# Patient Record
Sex: FEMALE | Race: WHITE | NOT HISPANIC OR LATINO | Employment: OTHER | URBAN - METROPOLITAN AREA
[De-identification: names, ages, dates, MRNs, and addresses within clinical notes are randomized per-mention and may not be internally consistent; named-entity substitution may affect disease eponyms.]

---

## 2024-03-18 NOTE — PROGRESS NOTES
GYNECOLOGIC ONCOLOGY CONSULT    Patient Name: FREDERICK SPIVEY Patient : 1950  Patient MRN: [id number]  Referring Physician: David Quach MD  Primary GYNOncologist: Hiral Arroyo (Gynecological/Oncology)  Date of Service: 2024    Reason for Consult:  Pelvic mass  Postmenopausal bleeding   Morbid obesity, BMI 46.53    History of Present Illness (Gyn Oncology):  Frederick is a 74 yo  female with postmenopausal bleeding and complex, pelvic mass. She has had postmenopausal bleeding since  with cramping and intermittent, like she is resuming menses. She reports this coincided with her COVID-19 vaccine. Endometrial stripe is noted to be thickened at 9 mm. She also had multiple myomas visualized on pelvic ultrasound. The uterus measured 9.9 x 6.1 x 5.2 cm. The left ovary was not identified. The right adnexal complex measured 9.2 x 6.2 x 5.9 cm. She saw Dr. Quach for consultation on 2023 after noting postmenopausal bleeding during her routine Medicare wellness visit on 2023. Tumor markers were drawn on 2023 and noted normal Ca-125, normal CEA and normal Ca 19-9. She continues to endorse cramping pain. Her daughter is present at today's visit. Frederick walks with assistance of walker, has history of TIA's and has difficulty with movement of right wrist and hand secondary to history of a fracture. She also reports a remote coccyx fracture on the log chute ride at [facility] and reports lying flat is extremely painful. A previous MRI of her spine had to be done under anesthesia as could not tolerate that positioning. She also has had a new tremor with referral to neurology. She lives independently in [facility]. She is here today to discuss management options in regards to these abnormal symptoms and ultrasonographic findings. Of note, she reports she is DNR/DNI and that she would not be interested in future radiation therapy or chemotherapy. She wants focus to be on quality of life. Her  daughter is her medical POA, too, and confirms that she has talked to her mother in regards to these goals of care.    Genetic Testing  N/A    Review of Systems:  A complete 14-point review of systems is negative except as noted in the above history of present illness.    Past Medical History:  1. TIAs  2. Urinary incontinence, mixed stress and urge  3. BMI 46.53  4. Fibromyalgia    Surgical History:  1. Oral surgery -   2. Right wrist surgery -     OB/Gyn History:  .  x 1.  Menarche: age 8 yo  Reports hx of fibroids  Reports hx of heavy vaginal bleeding  Reports hx of pelvic prolapse and urinary problems    Allergies#  No known medication allergies    Medications:  Vitamin D3 (Cholecalciferol Oral)  Aspirin Oral 81 mg tablet,delayed release (DR/EC) 1 tablet,delayed release (DR/EC) orally daily  Zinc Oral  Vitamin C (Ascorbic Acid Oral)    Family History:  Lymphoma - father in   Lung Ca - brother in   Bone Ca - brother in   Mesothelioma - uncle in   Breast Ca - aunt in     Social History:  Smoking Status Smoking Tobacco : Never smoker; Smokeless Tobacco : Never used smokeless tobacco; Vaping : Never vaped  Minimal alcohol use.   Single.  Lives alone in senior living complex.   Relies on daughter & God for emotional support and help with decisions.    Health Maintenance:  Last colonoscopy - 2019  Last mammogram - unsure; cannot recall  Last pap smear - unsure; cannot recall    Vital Signs:  Blood pressure: 142/82, Pulse: 91, Temperature: 98.4 F, Respirations: 16, O2 sat: 97%, Pain Scale: 4, Height: 62 in, Weight: 254.4 lb, BSA: 2.12, BMI: 46.53 kg/m2    Physical Exam (Gyn Oncology):  General: alert, cooperative, conversational, no acute distress  HEENT: atraumatic, normocephalic, trachea midline, no obvious thyromegaly  Cardiac: well-perfused  Lungs: no labored breathing on room air  Abdomen: obese, soft, non-tender  Extremities: mild pitting edema, bilaterally, of lower  extremities. Difficulty with range of motion in right wrist and right hand.  Neurologic: no focal deficits  Psych: normal mood and affect  Pelvic: deferred to OR      Laboratory Data:  Tumor markers drawn on 12/5/2023  Ca 125 = 10.4 u/mL  CEA  = 4.8 ng/mL  Ca 19-9 = 10 IU/mL            Imaging:  Pelvic Ultrasound on 11/29/2023  Findings:  The uterus is present, anteverted, without deviation and measures 9.9 x 6.1 x 5.2 cm.  The myometrium is heterogeneous.  The endometrium thickness if 9 mm.  Multiple myomas are noted in the uterus; the three largest as as follows:  Myoma #1 is submucosal, is located in the mid segment of the uterus, and measures 1.7 x 1.5 x 1.6 cm. It does appear to distort the endometrial lining.  Myoma #2 is subserosal, is located in the right and fundal segment of the uterus, and measures 2.8 x 2.8 x 1.6 cm. It does not appear to distort the endometrial lining.  Myoma #3 is intramural, is located in the mid segment of the uterus, and measures 3.2 x 2.3 x 2.1 cm. It does not appear to distort the endometrial lining.   Impression:  1. The uterus has myomas as described above.  2. Complex, somewhat ill-defined mass with mixed echogenicities is noted in the adnexal region measuring 9.2 x 6.2 x 5.9 cm. Similar in appearance but larger in size compared to pelvic ultrasound in 2013. Differential diagnosis includes uterine fibroid, dermoid cyst vs malignancy. Recommend follow up with pelvic MRI for further evaluation.  3. The left ovary was not identified in today's study.    Problems:  Pelvic mass  Postmenopausal bleeding    Assessment & Plan (Gyn Oncology):      1. Postmenopausal bleeding (since 2021), thickened endometrial stripe of 9 mm, complex right adnexal mass  Discussed concern for malignant process of either uterus, ovary and/or both sites  Discussed definitive diagnosis would only be determined via surgery  Discussed that surgery would also likely offer symptom alleviation (i.e.  "postmenopausal bleeding, cramping pain)  Reviewed risks, benefits and alternatives to surgery  Reviewed attempt at robotic approach for decreased blood loss and reduced postoperative pain & likely a little better recovery  Discussed alternatives would be to avoid surgery. However, the size of the mass is NOT at a size that I would recommend watchful waiting. She is also borderline for minimally invasive opportunity, so, if mass were to grow much larger, than, we might lose the ability to start out with MIS technique.  We reviewed benefits include: definitive diagnosis, symptom alleviation, possible therapeutic removal of cancer, ability to stage, ability to inform prognosis, ability to inform adjuvant therapies.  We reviewed risks include, but are not limited to: bleeding (amenable to blood transfusion, if needed), postoperative pain/discomfort, future hernia formation, infection, increased risks of blood clots, cardiac events, strokes, risks with general anesthesia, injury to surrounding structures and failure to cure.  She and daughter had a good understanding after today's detailed discussion. Thao does value quality of life and would likely not be interested in aggressive anti-cancer therapies postoperatively. However, she would like symptom alleviation and definitive diagnosis. She desires to proceed with surgery.  Preoperative surgical education was completed today with RN team.  Patient also had opportunity to meet with surgical schedulers & look at future dates.  All questions answered to Thao & her daughter's satisfaction today.    2. Preoperative considerations  \"Robotic-assisted total laparoscopic hysterectomy, bilateral salpingo-oophorectomy, pelvic washings, possible cancer staging\"  General anesthesia + TAP block  Will schedule at [facility]  Aware would need to convert to laparotomy if unable to tolerate steep Trendelenburg positioning, emergent bleeding or for cancer indications  Labs on day of surgery: " T&S, Hgb  Surgical antibiotic prophylaxis: IV Cefazolin 3 grams + IV Metronidazole 500 milligrams  VTE prophylaxis: bilateral SCDs, SQ Heparin 5,000 units  OR positioning: Will require additional padding, particularly over coccyx given history of fracture and pain with lying flat. Patient instructed to remind all OR team members on day of surgery.  Patient will be admitted for overnight observation given age and the fact that she lives alone with limited mobility. Will request PT/OT evaluation while admitted. Aware that may or may NOT qualify for further therapies and/or TCU. Aware would go home POD#1 with observation status unless medical justification and/or needs TCU placement; would then be changed to inpatient status, at that time.  Code status: DNR/DNI. Aware would have to lift DNI for intubation required with general anesthesia. She and her daughter will discuss more if she would want DNR lifted in OR or leave in place. Will let us know on day of surgery.    3. Multiple medical comorbidities  New tremor & balance issues; patient has been referred to neurology  Requires assistance of walker  BMI 46.53  Hx of TIA's        Treatment Plan and Consent    Current treatment plan of surgery for definitive diagnosis of pelvic mass and postmenopausal bleeding (as well as symptom alleviation) was reviewed in detail with the patient. See counseling above. Specifically, the counseling included: goals of the treatment, prognosis, frequency, intended benefits, associated risks/harms and side effects and medically reasonable alternatives.    I spent a total of 55 minutes of cumulative time in care of this patient, which included >50% of time spent in direct patient care which included patient interview, examination, and treatment counseling. The remainder of the time was spent in medical documentation, review of records and care coordination.    I provided the patient an opportunity to ask questions and I answered all of  their treatment related questions to the best of my ability. The patient expressed understanding and consent to this plan of care.      Pain Care Management:  Pain Scale: 4      Hiral Arroyo MD  Phone: [phone]  Fax: [phone]

## 2024-04-07 NOTE — OP NOTE
Gynecologic Oncology Operative Report    2024  Thao Majano  [id number]    PREOPERATIVE DIAGNOSIS: Pelvic mass, postmenopausal bleeding, thickened endometrial stripe of 9 mm, BMI 46.53    POSTOPERATIVE DIAGNOSIS: Same    PROCEDURES:   Procedure(s):  ROBOTIC-ASSISTED TOTAL LAPAROSCOPIC HYSTERECTOMY, BILATERAL SALPINGO-OOPHORECTOMY, REMOVAL OF PELVIC MASS, PELVIC WASHINGS    SURGEON: Hiral Arroyo MD    FIRST ASSISTANT: Greta Caldwell PA-C    ANESTHESIA: General endotracheal.     ESTIMATED BLOOD LOSS: 300 cc     IV FLUIDS: 1400 ml crystalloid    URINE OUTPUT: 500 cc clear urine     INDICATIONS: Thao Majano is a 72 yo  female with postmenopausal bleeding and complex, pelvic mass. She has had postmenopausal bleeding since  with cramping and intermittent, like she is resumed menses. Endometrial stripe was noted to be thickened at 9 mm. She also had multiple myomas visualized on pelvic ultrasound. The uterus measured 9.9 x 6.1 x 5.2 cm. The left ovary was not identified. The right adnexal complex measured 9.2 x 6.2 x 5.9 cm. She saw Dr. Quach for consultation on 2023 after noting postmenopausal bleeding during her routine Medicare wellness visit on 2023. Tumor markers were drawn on 2023 and noted normal Ca-125, normal CEA and normal Ca 19-9. She desired to proceed with definitive surgical management.    FINDINGS: The uterus was moderately enlarged with grossly normal bilateral adnexa. There was a large, right paracervical mass arising from the uterus that was retroperitoneal in nature and bilobed. There were attachments to the bladder, right round ligament. right pelvic sidewall, posterior uterus and rectovaginal peritoneal reflection. The mass was well-circumscribed, ~15 x 12 cm, and removed intact without intraoperative spillage or rupture. The mass was  from the right lower uterine segment / cervix attachment. Bilateral ureters were without hydronephrosis and were  noted to be vermiculating. There was no bulky retroperitoneal adenopathy. The peritoneal surfaces were smooth, including bilateral hemidiaphragms. The liver, stomach and omentum were grossly normal. There was an omental adhesion to the anterior abdominal wall in midline near the camera port, that was not taken down as did not impact visualization. There was diverticular disease noted along the colon, but, otherwise small bowel and colon were grossly normal.     Intraoperative frozen section analysis noted benign findings within the endometrium and benign findings for the uterine mass, consistent with degenerating leiomyoma. No cancer staging procedures were indicated based on this current information.    Bilateral sulcal vaginal lacerations were repaired at the conclusion of the case. The vaginal cuff was intact. The vaginal introitus was narrowed in proximal portion with the laceration repair, but, cuff still palpable and vagina still patent.     SPECIMENS:   ID Type Source Tests Collected by Time Destination   1 : PELVIC WASHINGS Washings Pelvis NON-GYNECOLOGIC CYTOLOGY Hiral Arroyo MD 4/11/2024 11:37 AM    2 : UTERUS, CERVIX, BILATERAL FALLOPIAN TUBE AND OVARIES -- FROZEN ON ENDOMETRIUM Tissue Uterus, Cervix, Bilateral Fallopian Tubes & Ovaries SURGICAL PATHOLOGY EXAM Hiral Arroyo MD 4/11/2024 12:37 PM    3 : UTERINE MASS FOR FROZEN EXAM Tissue Other SURGICAL PATHOLOGY EXAM Hiral Arroyo MD 4/11/2024 12:53 PM        COMPLICATIONS: None.    CONDITION: Stable to PACU.    OPERATIVE PROCEDURE IN DETAIL: Consent was reviewed with the patient in the preoperative setting and confirmed. She received prophylactic antibiotics with IV Cefazolin 2 grams and IV Metronidazole 500 milligrams. In addition, she received  prophylactic SQ Heparin 5,000 units for venous thromboembolism prevention. A surgical debriefing was performed with the operating room team prior to patient entry into the operating room. The  patient was transferred to the operating room and placed in dorsal supine position. General anesthetic was obtained in the usual manner without noted difficulties. The patient was then positioned onto Naveen stirrups. The patient was prepped and draped for the above-mentioned procedure.    Timeout was called at which point the patient's name, procedure and operative site was confirmed by the operative team. The umbilicus was elevated and the Veress needle introduced through the base of the umbilicus. The abdomen was insufflated with an opening pressure of 3 mmHg. The Veress needle was removed. Sites for the da Robinson XI laparoscopic ports were demarcated, total of 5, initiating' midline at the level of the umbilicus and positioned in a straight line around the upper abdomen. The initial midline 8 mm port was inserted without difficulty. The remaining 4 ports were all placed under direct visualization, without any difficulties. The lateral most right port was a 8 mm AirSeal port. CO2 insufflation was switched over to AirSeal mode. The pelvis was inspected as well as the upper abdomen as noted above. Next, a Barr catheter was placed under sterile technique. A speculum was placed into the vagina. Instruments for the uterine manipulator were positioned. The anterior lip of the cervix was grasped. Next, the uterus sounded to 12 cm, and cervix was serially dilated. The medium VCare vaginal manipulator was then inserted and the cervical cup affixed without any difficulty. Gloves were changed and attention was turned back towards the abdomen. Bowels were packed up into the upper abdomen with gentle traction. At this point, da Robinson XI was docked onto the ports, all appropriate instruments were inserted.     The left round ligament was grasped, elevated, sealed and transected using synchroseal device. The posterior leaf of the left broad ligament was dissected. The left ureter was identified. A peritoneal window was made just  below the left IP ligament and well above the left ureter. The left IP ligament was multiply sealed and transected using the synchroseal device. The peritoneum underneath the left adnexa was dissected to the level of the uterus. Attention was turned to the right side. The same process was repeated on the right side. However, there was large mass on the right side that made this side technically more challenging. The mass had filmy peritoneal adhesions circumferentially. These were dissected using monopolar scissors along with traction and counter traction. The mass had a broad connection to the right side of the cervix, grossly consistent with paracervical fibroid. This connection was disrupted using synchroseal device. This was necessary to be able to visualize the pedicles for hysterectomy as well as for specimen removal. This added more blood loss than typical robotic hysterectomy case. The mass was carefully unroofed from the surrounding peritoneum with care to avoid injury to the right ureter and bladder.     At this point, we initiated the hysterectomy by incising the vesicouterine peritoneum. The bladder flap was developed well at the upper vagina. The uterus had adequate mobility, bilateral uterine arteries could be isolated and these were cauterized with the bipolar cautery and transected. Initially on the right, we extended along the cardinal and uterosacral ligaments, staying close to the cervix to the level of the upper vagina. This was cauterized and transected using the bipolar cautery. Similarly, we isolated out the left cardinal ligament and uterosacral ligament which were cauterized and transected. The line of the medium VCare cup was palpated at the level of the upper vagina in a circumferential manner. We incised along the upper vagina to resect the cervix and uterus. A 15 mm Endocatch bag was inserted and specimen was placed inside the bag. The specimen was brought out through the vaginal opening  "and labeled \"uterus, cervix, bilateral fallopian tubes and ovaries\" and sent to surgical pathology for intraoperative frozen section analysis of endometrium.     A new 15 mm Endocatch bag was inserted transvaginally and \"uterine mass\" was placed within the bag and removed through the vaginal opening. \"Uterine mass\" was sent to surgical pathology for intraoperative frozen section analysis.    A wet laparotomy sponge was inserted to obtain adequate insufflation. The vaginal cuff had been well-developed and clearly the bladder was out of the way. The vaginal cuff was closed with running V-lock suture with excellent reapproximation.     Intraoperative frozen section analysis noted benign findings within the endometrium and benign findings for the uterine mass, consistent with degenerating leiomyoma. No cancer staging procedures were indicated based on this current information.    The pelvis was re-inspected and copiously irrigated. A total of 20 cc of Surgiflo was placed along the pelvic dissection beds.    All laparoscopic instruments and ports were now removed and CO2 allowed to escape from the abdomen. The Gymbox XI robot was undocked without incident. Skin was reapproximated with 4-0 Monocryl sutures and skin glue applied.    The laparotomy sponge was removed from the vagina. The Barr catheter was left in situ. Bilateral sulcal lacerations were repaired in the usual fashion using running sutures of 2-0 Vicryl. The vaginal cuff was intact. The vaginal introitus was narrowed in proximal portion with the laceration repair, but, cuff still palpable and vagina still patent.     Greta Caldwell PA-C, was present and assisted the entire procedure. She assisted with placement of uterine manipulator, abdominal entry, port placement, docking of robotic arms, adequate visualization, retraction, uterine manipulation, bedside assisting via assistant port, surgical specimen handling/retrieval, undocking of robotic arms, and skin " closure.    All sponge, lap, needle and instrument counts were correct x 2. The patient tolerated the procedure well and there were no complications. She was returned to the supine position and general anesthesia was reversed without difficulty. She was taken to the recovery room in stable condition. I was present and scrubbed the entire procedure.    Hiral Arroyo MD  Gynecologic Oncology  [facility]  04/11/2024

## 2024-04-09 RX ORDER — ASPIRIN 81 MG/1
81 TABLET ORAL DAILY PRN
COMMUNITY

## 2024-04-09 RX ORDER — MULTIVIT-MIN/IRON/FOLIC ACID/K 18-600-40
1000 CAPSULE ORAL EVERY MORNING
Status: ON HOLD | COMMUNITY
End: 2024-04-15

## 2024-04-09 RX ORDER — MULTIVITAMIN
1 TABLET ORAL EVERY MORNING
Status: ON HOLD | COMMUNITY
End: 2024-04-15

## 2024-04-09 RX ORDER — TURMERIC ROOT EXTRACT 500 MG
2 TABLET ORAL EVERY MORNING
COMMUNITY

## 2024-04-09 RX ORDER — ZINC SULFATE 50(220)MG
220 CAPSULE ORAL EVERY MORNING
Status: ON HOLD | COMMUNITY
End: 2024-04-15

## 2024-04-09 RX ORDER — VITAMIN E (DL,TOCOPHERYL ACET) 90 MG
200 CAPSULE ORAL DAILY
Status: ON HOLD | COMMUNITY
End: 2024-04-15

## 2024-04-11 ENCOUNTER — HOSPITAL ENCOUNTER (OUTPATIENT)
Facility: CLINIC | Age: 74
Discharge: NURSING, REHAB OR LONG-TERM CARE | End: 2024-04-15
Attending: OBSTETRICS & GYNECOLOGY | Admitting: OBSTETRICS & GYNECOLOGY
Payer: COMMERCIAL

## 2024-04-11 ENCOUNTER — ANESTHESIA EVENT (OUTPATIENT)
Dept: SURGERY | Facility: CLINIC | Age: 74
End: 2024-04-11
Payer: COMMERCIAL

## 2024-04-11 ENCOUNTER — ANESTHESIA (OUTPATIENT)
Dept: SURGERY | Facility: CLINIC | Age: 74
End: 2024-04-11
Payer: COMMERCIAL

## 2024-04-11 DIAGNOSIS — D25.0 INTRAMURAL AND SUBMUCOUS LEIOMYOMA OF UTERUS: Primary | ICD-10-CM

## 2024-04-11 DIAGNOSIS — B37.9 CANDIDA INFECTION: ICD-10-CM

## 2024-04-11 DIAGNOSIS — D25.1 INTRAMURAL AND SUBMUCOUS LEIOMYOMA OF UTERUS: Primary | ICD-10-CM

## 2024-04-11 PROBLEM — D25.9 UTERINE FIBROID: Status: ACTIVE | Noted: 2024-04-11

## 2024-04-11 LAB
ABO/RH(D): NORMAL
ANTIBODY SCREEN: NEGATIVE
HCG INTACT+B SERPL-ACNC: <1 MIU/ML
HGB BLD-MCNC: 13.5 G/DL (ref 11.7–15.7)
PLATELET # BLD AUTO: 211 10E3/UL (ref 150–450)
SPECIMEN EXPIRATION DATE: NORMAL

## 2024-04-11 PROCEDURE — 999N000141 HC STATISTIC PRE-PROCEDURE NURSING ASSESSMENT: Performed by: OBSTETRICS & GYNECOLOGY

## 2024-04-11 PROCEDURE — 88342 IMHCHEM/IMCYTCHM 1ST ANTB: CPT | Mod: 26 | Performed by: STUDENT IN AN ORGANIZED HEALTH CARE EDUCATION/TRAINING PROGRAM

## 2024-04-11 PROCEDURE — 258N000003 HC RX IP 258 OP 636: Performed by: PHYSICIAN ASSISTANT

## 2024-04-11 PROCEDURE — 250N000009 HC RX 250: Performed by: REGISTERED NURSE

## 2024-04-11 PROCEDURE — 86336 INHIBIN A: CPT | Performed by: OBSTETRICS & GYNECOLOGY

## 2024-04-11 PROCEDURE — 36415 COLL VENOUS BLD VENIPUNCTURE: CPT | Performed by: PHYSICIAN ASSISTANT

## 2024-04-11 PROCEDURE — 258N000001 HC RX 258: Performed by: OBSTETRICS & GYNECOLOGY

## 2024-04-11 PROCEDURE — 88341 IMHCHEM/IMCYTCHM EA ADD ANTB: CPT | Mod: 26 | Performed by: STUDENT IN AN ORGANIZED HEALTH CARE EDUCATION/TRAINING PROGRAM

## 2024-04-11 PROCEDURE — 85049 AUTOMATED PLATELET COUNT: CPT | Performed by: PHYSICIAN ASSISTANT

## 2024-04-11 PROCEDURE — 710N000009 HC RECOVERY PHASE 1, LEVEL 1, PER MIN: Performed by: OBSTETRICS & GYNECOLOGY

## 2024-04-11 PROCEDURE — 88112 CYTOPATH CELL ENHANCE TECH: CPT | Mod: TC | Performed by: OBSTETRICS & GYNECOLOGY

## 2024-04-11 PROCEDURE — 88331 PATH CONSLTJ SURG 1 BLK 1SPC: CPT | Mod: 26 | Performed by: PATHOLOGY

## 2024-04-11 PROCEDURE — 84702 CHORIONIC GONADOTROPIN TEST: CPT | Performed by: PHYSICIAN ASSISTANT

## 2024-04-11 PROCEDURE — 250N000011 HC RX IP 250 OP 636: Performed by: REGISTERED NURSE

## 2024-04-11 PROCEDURE — 58542 LSH W/T/O UT 250 G OR LESS: CPT | Performed by: REGISTERED NURSE

## 2024-04-11 PROCEDURE — 250N000009 HC RX 250: Performed by: OBSTETRICS & GYNECOLOGY

## 2024-04-11 PROCEDURE — 88331 PATH CONSLTJ SURG 1 BLK 1SPC: CPT | Mod: TC,59 | Performed by: OBSTETRICS & GYNECOLOGY

## 2024-04-11 PROCEDURE — 250N000025 HC SEVOFLURANE, PER MIN: Performed by: OBSTETRICS & GYNECOLOGY

## 2024-04-11 PROCEDURE — 258N000003 HC RX IP 258 OP 636: Performed by: REGISTERED NURSE

## 2024-04-11 PROCEDURE — 250N000011 HC RX IP 250 OP 636: Performed by: PHYSICIAN ASSISTANT

## 2024-04-11 PROCEDURE — 99100 ANES PT EXTEME AGE<1 YR&>70: CPT | Performed by: REGISTERED NURSE

## 2024-04-11 PROCEDURE — 86900 BLOOD TYPING SEROLOGIC ABO: CPT | Performed by: PHYSICIAN ASSISTANT

## 2024-04-11 PROCEDURE — 250N000013 HC RX MED GY IP 250 OP 250 PS 637: Performed by: PHYSICIAN ASSISTANT

## 2024-04-11 PROCEDURE — 250N000011 HC RX IP 250 OP 636: Mod: JZ | Performed by: PHYSICIAN ASSISTANT

## 2024-04-11 PROCEDURE — 58542 LSH W/T/O UT 250 G OR LESS: CPT | Performed by: ANESTHESIOLOGY

## 2024-04-11 PROCEDURE — 272N000001 HC OR GENERAL SUPPLY STERILE: Performed by: OBSTETRICS & GYNECOLOGY

## 2024-04-11 PROCEDURE — 85018 HEMOGLOBIN: CPT | Performed by: OBSTETRICS & GYNECOLOGY

## 2024-04-11 PROCEDURE — 88309 TISSUE EXAM BY PATHOLOGIST: CPT | Mod: 26 | Performed by: STUDENT IN AN ORGANIZED HEALTH CARE EDUCATION/TRAINING PROGRAM

## 2024-04-11 PROCEDURE — 360N000080 HC SURGERY LEVEL 7, PER MIN: Performed by: OBSTETRICS & GYNECOLOGY

## 2024-04-11 PROCEDURE — 250N000011 HC RX IP 250 OP 636: Performed by: OBSTETRICS & GYNECOLOGY

## 2024-04-11 PROCEDURE — 82565 ASSAY OF CREATININE: CPT | Performed by: OBSTETRICS & GYNECOLOGY

## 2024-04-11 PROCEDURE — 370N000017 HC ANESTHESIA TECHNICAL FEE, PER MIN: Performed by: OBSTETRICS & GYNECOLOGY

## 2024-04-11 PROCEDURE — 88307 TISSUE EXAM BY PATHOLOGIST: CPT | Mod: 26 | Performed by: STUDENT IN AN ORGANIZED HEALTH CARE EDUCATION/TRAINING PROGRAM

## 2024-04-11 RX ORDER — NALOXONE HYDROCHLORIDE 0.4 MG/ML
0.2 INJECTION, SOLUTION INTRAMUSCULAR; INTRAVENOUS; SUBCUTANEOUS
Status: DISCONTINUED | OUTPATIENT
Start: 2024-04-11 | End: 2024-04-15 | Stop reason: HOSPADM

## 2024-04-11 RX ORDER — ONDANSETRON 4 MG/1
4 TABLET, ORALLY DISINTEGRATING ORAL EVERY 30 MIN PRN
Status: DISCONTINUED | OUTPATIENT
Start: 2024-04-11 | End: 2024-04-11 | Stop reason: HOSPADM

## 2024-04-11 RX ORDER — SODIUM CHLORIDE, SODIUM LACTATE, POTASSIUM CHLORIDE, CALCIUM CHLORIDE 600; 310; 30; 20 MG/100ML; MG/100ML; MG/100ML; MG/100ML
INJECTION, SOLUTION INTRAVENOUS CONTINUOUS PRN
Status: DISCONTINUED | OUTPATIENT
Start: 2024-04-11 | End: 2024-04-11

## 2024-04-11 RX ORDER — ONDANSETRON 2 MG/ML
4 INJECTION INTRAMUSCULAR; INTRAVENOUS EVERY 6 HOURS PRN
Status: DISCONTINUED | OUTPATIENT
Start: 2024-04-11 | End: 2024-04-15 | Stop reason: HOSPADM

## 2024-04-11 RX ORDER — CALCIUM CARBONATE 500 MG/1
500 TABLET, CHEWABLE ORAL 4 TIMES DAILY PRN
Status: DISCONTINUED | OUTPATIENT
Start: 2024-04-11 | End: 2024-04-15 | Stop reason: HOSPADM

## 2024-04-11 RX ORDER — KETOROLAC TROMETHAMINE 15 MG/ML
15 INJECTION, SOLUTION INTRAMUSCULAR; INTRAVENOUS EVERY 6 HOURS
Qty: 20 ML | Refills: 0 | Status: DISCONTINUED | OUTPATIENT
Start: 2024-04-11 | End: 2024-04-15 | Stop reason: HOSPADM

## 2024-04-11 RX ORDER — IBUPROFEN 600 MG/1
600 TABLET, FILM COATED ORAL EVERY 6 HOURS
Qty: 20 TABLET | Refills: 0 | Status: DISCONTINUED | OUTPATIENT
Start: 2024-04-11 | End: 2024-04-15 | Stop reason: HOSPADM

## 2024-04-11 RX ORDER — PROPOFOL 10 MG/ML
INJECTION, EMULSION INTRAVENOUS PRN
Status: DISCONTINUED | OUTPATIENT
Start: 2024-04-11 | End: 2024-04-11

## 2024-04-11 RX ORDER — HEPARIN SODIUM 5000 [USP'U]/.5ML
5000 INJECTION, SOLUTION INTRAVENOUS; SUBCUTANEOUS
Status: COMPLETED | OUTPATIENT
Start: 2024-04-11 | End: 2024-04-11

## 2024-04-11 RX ORDER — OXYCODONE HYDROCHLORIDE 5 MG/1
10 TABLET ORAL EVERY 4 HOURS PRN
Status: DISCONTINUED | OUTPATIENT
Start: 2024-04-11 | End: 2024-04-15 | Stop reason: HOSPADM

## 2024-04-11 RX ORDER — AMOXICILLIN 250 MG
1 CAPSULE ORAL 2 TIMES DAILY
Status: DISCONTINUED | OUTPATIENT
Start: 2024-04-11 | End: 2024-04-15 | Stop reason: HOSPADM

## 2024-04-11 RX ORDER — MEPERIDINE HYDROCHLORIDE 25 MG/ML
12.5 INJECTION INTRAMUSCULAR; INTRAVENOUS; SUBCUTANEOUS EVERY 5 MIN PRN
Status: DISCONTINUED | OUTPATIENT
Start: 2024-04-11 | End: 2024-04-11 | Stop reason: HOSPADM

## 2024-04-11 RX ORDER — SODIUM CHLORIDE 9 MG/ML
INJECTION, SOLUTION INTRAVENOUS CONTINUOUS
Status: DISCONTINUED | OUTPATIENT
Start: 2024-04-11 | End: 2024-04-12

## 2024-04-11 RX ORDER — HYDROMORPHONE HCL IN WATER/PF 6 MG/30 ML
0.2 PATIENT CONTROLLED ANALGESIA SYRINGE INTRAVENOUS EVERY 5 MIN PRN
Status: DISCONTINUED | OUTPATIENT
Start: 2024-04-11 | End: 2024-04-11 | Stop reason: HOSPADM

## 2024-04-11 RX ORDER — HYDROMORPHONE HYDROCHLORIDE 1 MG/ML
INJECTION, SOLUTION INTRAMUSCULAR; INTRAVENOUS; SUBCUTANEOUS PRN
Status: DISCONTINUED | OUTPATIENT
Start: 2024-04-11 | End: 2024-04-11

## 2024-04-11 RX ORDER — CEFAZOLIN SODIUM/WATER 2 G/20 ML
2 SYRINGE (ML) INTRAVENOUS SEE ADMIN INSTRUCTIONS
Status: DISCONTINUED | OUTPATIENT
Start: 2024-04-11 | End: 2024-04-11 | Stop reason: HOSPADM

## 2024-04-11 RX ORDER — SODIUM CHLORIDE, SODIUM LACTATE, POTASSIUM CHLORIDE, CALCIUM CHLORIDE 600; 310; 30; 20 MG/100ML; MG/100ML; MG/100ML; MG/100ML
INJECTION, SOLUTION INTRAVENOUS CONTINUOUS
Status: DISCONTINUED | OUTPATIENT
Start: 2024-04-11 | End: 2024-04-11 | Stop reason: HOSPADM

## 2024-04-11 RX ORDER — CARBOXYMETHYLCELLULOSE SODIUM 5 MG/ML
1 SOLUTION/ DROPS OPHTHALMIC 3 TIMES DAILY PRN
COMMUNITY

## 2024-04-11 RX ORDER — OXYCODONE HYDROCHLORIDE 5 MG/1
5 TABLET ORAL EVERY 4 HOURS PRN
Status: DISCONTINUED | OUTPATIENT
Start: 2024-04-11 | End: 2024-04-15 | Stop reason: HOSPADM

## 2024-04-11 RX ORDER — DEXAMETHASONE SODIUM PHOSPHATE 4 MG/ML
INJECTION, SOLUTION INTRA-ARTICULAR; INTRALESIONAL; INTRAMUSCULAR; INTRAVENOUS; SOFT TISSUE PRN
Status: DISCONTINUED | OUTPATIENT
Start: 2024-04-11 | End: 2024-04-11

## 2024-04-11 RX ORDER — CEFAZOLIN SODIUM/WATER 2 G/20 ML
2 SYRINGE (ML) INTRAVENOUS
Status: COMPLETED | OUTPATIENT
Start: 2024-04-11 | End: 2024-04-11

## 2024-04-11 RX ORDER — ACETAMINOPHEN 325 MG/1
975 TABLET ORAL ONCE
Status: COMPLETED | OUTPATIENT
Start: 2024-04-11 | End: 2024-04-11

## 2024-04-11 RX ORDER — ACETAMINOPHEN 500 MG
1000 TABLET ORAL EVERY 6 HOURS
Qty: 20 TABLET | Refills: 0 | Status: COMPLETED | OUTPATIENT
Start: 2024-04-11 | End: 2024-04-14

## 2024-04-11 RX ORDER — NALOXONE HYDROCHLORIDE 0.4 MG/ML
0.4 INJECTION, SOLUTION INTRAMUSCULAR; INTRAVENOUS; SUBCUTANEOUS
Status: DISCONTINUED | OUTPATIENT
Start: 2024-04-11 | End: 2024-04-15 | Stop reason: HOSPADM

## 2024-04-11 RX ORDER — NALOXONE HYDROCHLORIDE 0.4 MG/ML
0.1 INJECTION, SOLUTION INTRAMUSCULAR; INTRAVENOUS; SUBCUTANEOUS
Status: DISCONTINUED | OUTPATIENT
Start: 2024-04-11 | End: 2024-04-11 | Stop reason: HOSPADM

## 2024-04-11 RX ORDER — LIDOCAINE HYDROCHLORIDE 20 MG/ML
INJECTION, SOLUTION INFILTRATION; PERINEURAL PRN
Status: DISCONTINUED | OUTPATIENT
Start: 2024-04-11 | End: 2024-04-11

## 2024-04-11 RX ORDER — HYDROMORPHONE HCL IN WATER/PF 6 MG/30 ML
0.2 PATIENT CONTROLLED ANALGESIA SYRINGE INTRAVENOUS
Status: DISCONTINUED | OUTPATIENT
Start: 2024-04-11 | End: 2024-04-15 | Stop reason: HOSPADM

## 2024-04-11 RX ORDER — PROCHLORPERAZINE MALEATE 5 MG
5 TABLET ORAL EVERY 6 HOURS PRN
Status: DISCONTINUED | OUTPATIENT
Start: 2024-04-11 | End: 2024-04-15 | Stop reason: HOSPADM

## 2024-04-11 RX ORDER — FENTANYL CITRATE 0.05 MG/ML
50 INJECTION, SOLUTION INTRAMUSCULAR; INTRAVENOUS EVERY 5 MIN PRN
Status: DISCONTINUED | OUTPATIENT
Start: 2024-04-11 | End: 2024-04-11 | Stop reason: HOSPADM

## 2024-04-11 RX ORDER — FENTANYL CITRATE 0.05 MG/ML
25 INJECTION, SOLUTION INTRAMUSCULAR; INTRAVENOUS EVERY 5 MIN PRN
Status: DISCONTINUED | OUTPATIENT
Start: 2024-04-11 | End: 2024-04-11 | Stop reason: HOSPADM

## 2024-04-11 RX ORDER — HYDROMORPHONE HCL IN WATER/PF 6 MG/30 ML
0.4 PATIENT CONTROLLED ANALGESIA SYRINGE INTRAVENOUS EVERY 5 MIN PRN
Status: DISCONTINUED | OUTPATIENT
Start: 2024-04-11 | End: 2024-04-11 | Stop reason: HOSPADM

## 2024-04-11 RX ORDER — ONDANSETRON 2 MG/ML
INJECTION INTRAMUSCULAR; INTRAVENOUS PRN
Status: DISCONTINUED | OUTPATIENT
Start: 2024-04-11 | End: 2024-04-11

## 2024-04-11 RX ORDER — METRONIDAZOLE 500 MG/100ML
500 INJECTION, SOLUTION INTRAVENOUS
Status: COMPLETED | OUTPATIENT
Start: 2024-04-11 | End: 2024-04-11

## 2024-04-11 RX ORDER — MAGNESIUM HYDROXIDE 1200 MG/15ML
LIQUID ORAL PRN
Status: DISCONTINUED | OUTPATIENT
Start: 2024-04-11 | End: 2024-04-11 | Stop reason: HOSPADM

## 2024-04-11 RX ORDER — HYDROMORPHONE HCL IN WATER/PF 6 MG/30 ML
0.4 PATIENT CONTROLLED ANALGESIA SYRINGE INTRAVENOUS
Status: DISCONTINUED | OUTPATIENT
Start: 2024-04-11 | End: 2024-04-15 | Stop reason: HOSPADM

## 2024-04-11 RX ORDER — ONDANSETRON 4 MG/1
4 TABLET, ORALLY DISINTEGRATING ORAL EVERY 6 HOURS PRN
Status: DISCONTINUED | OUTPATIENT
Start: 2024-04-11 | End: 2024-04-15 | Stop reason: HOSPADM

## 2024-04-11 RX ORDER — HEPARIN SODIUM 5000 [USP'U]/.5ML
5000 INJECTION, SOLUTION INTRAVENOUS; SUBCUTANEOUS EVERY 8 HOURS
Status: DISCONTINUED | OUTPATIENT
Start: 2024-04-12 | End: 2024-04-13

## 2024-04-11 RX ORDER — LIDOCAINE 40 MG/G
CREAM TOPICAL
Status: DISCONTINUED | OUTPATIENT
Start: 2024-04-11 | End: 2024-04-15 | Stop reason: HOSPADM

## 2024-04-11 RX ORDER — IBUPROFEN 200 MG
200 TABLET ORAL EVERY 4 HOURS PRN
Status: ON HOLD | COMMUNITY
End: 2024-04-15

## 2024-04-11 RX ORDER — ONDANSETRON 2 MG/ML
4 INJECTION INTRAMUSCULAR; INTRAVENOUS EVERY 30 MIN PRN
Status: DISCONTINUED | OUTPATIENT
Start: 2024-04-11 | End: 2024-04-11 | Stop reason: HOSPADM

## 2024-04-11 RX ORDER — BUPIVACAINE HYDROCHLORIDE 5 MG/ML
INJECTION, SOLUTION EPIDURAL; INTRACAUDAL PRN
Status: DISCONTINUED | OUTPATIENT
Start: 2024-04-11 | End: 2024-04-11 | Stop reason: HOSPADM

## 2024-04-11 RX ORDER — FENTANYL CITRATE 50 UG/ML
INJECTION, SOLUTION INTRAMUSCULAR; INTRAVENOUS PRN
Status: DISCONTINUED | OUTPATIENT
Start: 2024-04-11 | End: 2024-04-11

## 2024-04-11 RX ADMIN — SODIUM CHLORIDE, POTASSIUM CHLORIDE, SODIUM LACTATE AND CALCIUM CHLORIDE: 600; 310; 30; 20 INJECTION, SOLUTION INTRAVENOUS at 09:59

## 2024-04-11 RX ADMIN — ROCURONIUM BROMIDE 50 MG: 50 INJECTION, SOLUTION INTRAVENOUS at 10:09

## 2024-04-11 RX ADMIN — PHENYLEPHRINE HYDROCHLORIDE 100 MCG: 10 INJECTION INTRAVENOUS at 10:54

## 2024-04-11 RX ADMIN — PHENYLEPHRINE HYDROCHLORIDE 0.3 MCG/KG/MIN: 10 INJECTION INTRAVENOUS at 13:14

## 2024-04-11 RX ADMIN — LIDOCAINE HYDROCHLORIDE 100 MG: 20 INJECTION, SOLUTION INFILTRATION; PERINEURAL at 10:09

## 2024-04-11 RX ADMIN — PHENYLEPHRINE HYDROCHLORIDE 100 MCG: 10 INJECTION INTRAVENOUS at 11:23

## 2024-04-11 RX ADMIN — SODIUM CHLORIDE: 9 INJECTION, SOLUTION INTRAVENOUS at 18:04

## 2024-04-11 RX ADMIN — Medication 2 G: at 10:20

## 2024-04-11 RX ADMIN — PROPOFOL 25 MG: 10 INJECTION, EMULSION INTRAVENOUS at 10:41

## 2024-04-11 RX ADMIN — ONDANSETRON 4 MG: 2 INJECTION INTRAMUSCULAR; INTRAVENOUS at 13:26

## 2024-04-11 RX ADMIN — PHENYLEPHRINE HYDROCHLORIDE 100 MCG: 10 INJECTION INTRAVENOUS at 11:42

## 2024-04-11 RX ADMIN — PHENYLEPHRINE HYDROCHLORIDE 100 MCG: 10 INJECTION INTRAVENOUS at 11:02

## 2024-04-11 RX ADMIN — HEPARIN SODIUM 5000 UNITS: 5000 INJECTION, SOLUTION INTRAVENOUS; SUBCUTANEOUS at 09:11

## 2024-04-11 RX ADMIN — ACETAMINOPHEN 975 MG: 325 TABLET, FILM COATED ORAL at 09:10

## 2024-04-11 RX ADMIN — PHENYLEPHRINE HYDROCHLORIDE 100 MCG: 10 INJECTION INTRAVENOUS at 11:32

## 2024-04-11 RX ADMIN — DOCUSATE SODIUM 50 MG AND SENNOSIDES 8.6 MG 1 TABLET: 8.6; 5 TABLET, FILM COATED ORAL at 21:27

## 2024-04-11 RX ADMIN — HYDROMORPHONE HYDROCHLORIDE 0.5 MG: 1 INJECTION, SOLUTION INTRAMUSCULAR; INTRAVENOUS; SUBCUTANEOUS at 12:25

## 2024-04-11 RX ADMIN — KETOROLAC TROMETHAMINE 15 MG: 15 INJECTION, SOLUTION INTRAMUSCULAR; INTRAVENOUS at 21:26

## 2024-04-11 RX ADMIN — ACETAMINOPHEN 1000 MG: 500 TABLET, FILM COATED ORAL at 21:27

## 2024-04-11 RX ADMIN — FENTANYL CITRATE 50 MCG: 50 INJECTION INTRAMUSCULAR; INTRAVENOUS at 11:30

## 2024-04-11 RX ADMIN — PHENYLEPHRINE HYDROCHLORIDE 0.5 MCG/KG/MIN: 10 INJECTION INTRAVENOUS at 11:42

## 2024-04-11 RX ADMIN — SODIUM CHLORIDE, POTASSIUM CHLORIDE, SODIUM LACTATE AND CALCIUM CHLORIDE: 600; 310; 30; 20 INJECTION, SOLUTION INTRAVENOUS at 12:27

## 2024-04-11 RX ADMIN — METRONIDAZOLE 500 MG: 500 INJECTION, SOLUTION INTRAVENOUS at 09:09

## 2024-04-11 RX ADMIN — FENTANYL CITRATE 50 MCG: 50 INJECTION INTRAMUSCULAR; INTRAVENOUS at 10:03

## 2024-04-11 RX ADMIN — PHENYLEPHRINE HYDROCHLORIDE 100 MCG: 10 INJECTION INTRAVENOUS at 10:46

## 2024-04-11 RX ADMIN — SUGAMMADEX 340 MG: 100 INJECTION, SOLUTION INTRAVENOUS at 13:47

## 2024-04-11 RX ADMIN — HYDROMORPHONE HYDROCHLORIDE 0.4 MG: 0.2 INJECTION, SOLUTION INTRAMUSCULAR; INTRAVENOUS; SUBCUTANEOUS at 18:03

## 2024-04-11 RX ADMIN — PROPOFOL 150 MG: 10 INJECTION, EMULSION INTRAVENOUS at 10:09

## 2024-04-11 RX ADMIN — DEXAMETHASONE SODIUM PHOSPHATE 4 MG: 4 INJECTION, SOLUTION INTRA-ARTICULAR; INTRALESIONAL; INTRAMUSCULAR; INTRAVENOUS; SOFT TISSUE at 10:50

## 2024-04-11 RX ADMIN — ROCURONIUM BROMIDE 20 MG: 50 INJECTION, SOLUTION INTRAVENOUS at 12:26

## 2024-04-11 RX ADMIN — ROCURONIUM BROMIDE 50 MG: 50 INJECTION, SOLUTION INTRAVENOUS at 11:04

## 2024-04-11 ASSESSMENT — ACTIVITIES OF DAILY LIVING (ADL)
ADLS_ACUITY_SCORE: 22
ADLS_ACUITY_SCORE: 30
ADLS_ACUITY_SCORE: 34
ADLS_ACUITY_SCORE: 30
ADLS_ACUITY_SCORE: 22
ADLS_ACUITY_SCORE: 34
ADLS_ACUITY_SCORE: 34
ADLS_ACUITY_SCORE: 33
ADLS_ACUITY_SCORE: 22
ADLS_ACUITY_SCORE: 34
ADLS_ACUITY_SCORE: 22
ADLS_ACUITY_SCORE: 22

## 2024-04-11 NOTE — ANESTHESIA CARE TRANSFER NOTE
Patient: Thao Majano    Procedure: Procedure(s):  ROBOTIC-ASSISTED TOTAL LAPAROSCOPIC HYSTERECTOMY, BILATERAL SALPINGO-OOPHORECTOMY , REMOVAL OF PELVIC MASS, PELVIC WASHINGS       Diagnosis: Pelvic mass [R19.00]  Post-menopause bleeding [N95.0]  Diagnosis Additional Information: No value filed.    Anesthesia Type:   General     Note:    Oropharynx: oropharynx clear of all foreign objects and spontaneously breathing  Level of Consciousness: drowsy  Oxygen Supplementation: face mask  Level of Supplemental Oxygen (L/min / FiO2): 6  Independent Airway: airway patency satisfactory and stable  Dentition: dentition unchanged  Vital Signs Stable: post-procedure vital signs reviewed and stable  Report to RN Given: handoff report given  Patient transferred to: PACU  Comments: Patient comfortable  Handoff Report: Identifed the Patient, Identified the Reponsible Provider, Reviewed the pertinent medical history, Discussed the surgical course, Reviewed Intra-OP anesthesia mangement and issues during anesthesia, Set expectations for post-procedure period and Allowed opportunity for questions and acknowledgement of understanding      Vitals:  Vitals Value Taken Time   /95 04/11/24 1359   Temp     Pulse 73 04/11/24 1401   Resp 15 04/11/24 1401   SpO2 100 % 04/11/24 1401   Vitals shown include unfiled device data.    Electronically Signed By: LORI Maldonado CRNA  April 11, 2024  2:02 PM

## 2024-04-11 NOTE — PROGRESS NOTES
PTA medications updated by Medication Scribe prior to surgery via phone call with patient (last doses completed by Nurse)     Medication history sources: Patient, H&P, and Patient's home med list  In the past week, patient estimated taking medication this percent of the time: Greater than 90%      Significant changes made to the medication list:  None      Additional medication history information:   None    Medication reconciliation completed by provider prior to medication history? No    Time spent in this activity: 40 minutes    The information provided in this note is only as accurate as the sources available at the time of update(s)      Prior to Admission medications    Medication Sig Last Dose Taking? Auth Provider Long Term End Date   Ascorbic Acid (VITAMIN C) 500 MG CAPS Take 1,000 mg by mouth every morning 02/01/2024 at am Yes Reported, Patient     aspirin 81 MG EC tablet Take 81 mg by mouth daily as needed 02/01/2024 at am Yes Reported, Patient     carboxymethylcellulose PF (REFRESH PLUS) 0.5 % ophthalmic solution Place 1 drop into both eyes 3 times daily as needed for dry eyes Unknown at prn Yes Reported, Patient     CINNAMON PO Take 1,200 mg by mouth every morning 02/01/2024 at am Yes Reported, Patient     FISH OIL-KRILL OIL PO Take 500 mg by mouth every morning 02/01/2024 at am Yes Reported, Patient     ibuprofen (ADVIL/MOTRIN) 200 MG tablet Take 200 mg by mouth every 4 hours as needed for pain 03/10/2024 at am Yes Reported, Patient     Misc Natural Products (ELDERBERRY IMMUNE COMPLEX) CHEW Take 400 mg by mouth every morning 02/01/2024 at am Yes Reported, Patient     multivitamin w/minerals (MULTI-VITAMIN) tablet Take 1 tablet by mouth every morning 02/01/2024 at am Yes Reported, Patient     polyethylene glycol-propylene glycol (SYSTANE ULTRA) 0.4-0.3 % SOLN ophthalmic solution Place 1 drop into both eyes daily as needed for dry eyes Unknown at prn Yes Reported, Patient     Turmeric 500 MG TABS Take 2  tablets by mouth every morning 02/01/2024 at am Yes Reported, Patient     Vitamin D3 (CHOLECALCIFEROL) 125 MCG (5000 UT) tablet Take 1 capsule by mouth every morning 02/01/2024 at am Yes Reported, Patient     Vitamin E 90 MG (200 UNIT) capsule Take 200 Units by mouth daily 02/01/2024 at am Yes Reported, Patient     zinc sulfate (ZINCATE) 220 (50 Zn) MG capsule Take 220 mg by mouth every morning 02/01/2024 at am Yes Reported, Patient         Medication history completed by: Rhonda Washington LPN

## 2024-04-11 NOTE — OR NURSING
Transfer criteria met.  Order received per Dr. Jordan to transfer to Surgical Nursing Care Unit for continued recovery.  Hand-off report given to RN.  To 2221-1 per cart with all belongings, including personal walker.  Will transport with Capnography monitoring.  Will notify patient's daughter, Katie of transfer.

## 2024-04-11 NOTE — ANESTHESIA POSTPROCEDURE EVALUATION
Patient: Thao Majano    Procedure: Procedure(s):  ROBOTIC-ASSISTED TOTAL LAPAROSCOPIC HYSTERECTOMY, BILATERAL SALPINGO-OOPHORECTOMY , REMOVAL OF PELVIC MASS, PELVIC WASHINGS       Anesthesia Type:  General    Note:     Postop Pain Control: Uneventful            Sign Out: Well controlled pain   PONV: No   Neuro/Psych: Uneventful            Sign Out: Acceptable/Baseline neuro status   Airway/Respiratory: Uneventful            Sign Out: Acceptable/Baseline resp. status   CV/Hemodynamics: Uneventful            Sign Out: Acceptable CV status; No obvious hypovolemia; No obvious fluid overload   Other NRE: NONE   DID A NON-ROUTINE EVENT OCCUR? No           Last vitals:  Vitals Value Taken Time   /98 04/11/24 1545   Temp 36.3  C (97.4  F) 04/11/24 1515   Pulse 65 04/11/24 1552   Resp 26 04/11/24 1553   SpO2 99 % 04/11/24 1554   Vitals shown include unfiled device data.    Electronically Signed By: Rogelio Marquez MD  April 11, 2024  5:19 PM

## 2024-04-11 NOTE — BRIEF OP NOTE
POST OPERATIVE NOTE-IMMEDIATE :    Procedures:  Procedure(s):  ROBOTIC-ASSISTED TOTAL LAPAROSCOPIC HYSTERECTOMY, BILATERAL SALPINGO-OOPHORECTOMY, REMOVAL OF PELVIC MASS, PELVIC WASHINGS    Preoperative Diagnosis:  Pelvic mass [R19.00]  Post-menopause bleeding [N95.0]    Postoperative Diagnosis:  Same    Prosthetic Devices:  None    Surgeon(s) and Assistants (if any):  Surgeon(s):  Lilliana Caldwell PA-C Mallen, Adrianne R, MD  Circulator: Melisa Astudillo RN  Relief Circulator: Estrella Palma RN; Alena Calderon RN  Relief Scrub: Radha العلي  Scrub Person: Kay Hagen    Anesthesia:  General    Estimated Blood Loss: 300 cc    IV Fluids: 1400 ml crystalloid    Urine Output: 500 ml     Drains: None    Specimens:    ID Type Source Tests Collected by Time Destination   1 : PELVIC WASHINGS Washings Pelvis NON-GYNECOLOGIC CYTOLOGY Hiral Arroyo MD 4/11/2024 11:37 AM    2 : UTERUS, CERVIX, BILATERAL FALLOPIAN TUBE AND OVARIES -- FROZEN ON ENDOMETRIUM Tissue Uterus, Cervix, Bilateral Fallopian Tubes & Ovaries SURGICAL PATHOLOGY EXAM Hiral Arroyo MD 4/11/2024 12:37 PM    3 : UTERINE MASS FOR FROZEN EXAM Tissue Other SURGICAL PATHOLOGY EXAM Hiral Arroyo MD 4/11/2024 12:53 PM        Complications: None    Findings/Conclusions: The uterus was moderately enlarged with grossly normal bilateral adnexa. There was a large, right paracervical mass arising from the uterus that was retroperitoneal in nature and bilobed. There were attachments to the bladder, right round ligament. right pelvic sidewall, posterior uterus and rectovaginal peritoneal reflection. The mass was well-circumscribed, ~15 x 12 cm, and removed intact without intraoperative spillage or rupture. The mass was  from the right lower uterine segment / cervix attachment. Bilateral ureters were without hydronephrosis and were noted to be vermiculating. There was no bulky retroperitoneal adenopathy. The  peritoneal surfaces were smooth, including bilateral hemidiaphragms. The liver, stomach and omentum were grossly normal. There was an omental adhesion to the anterior abdominal wall in midline near the camera port, that was not taken down as did not impact visualization. There was diverticular disease noted along the colon, but, otherwise small bowel and colon were grossly normal.     Intraoperative frozen section analysis noted benign findings within the endometrium and benign findings for the uterine mass, consistent with degenerating leiomyoma. No cancer staging procedures were indicated based on this current information.    Bilateral sulcal vaginal lacerations were repaired at the conclusion of the case. The vaginal cuff was intact. The vaginal introitus was narrowed in proximal portion with the laceration repair, but, cuff still palpable and vagina still patent.     Condition on discharge from OR:  Satisfactory    Hiral Arroyo MD  Gynecologic Oncology  [facility]  [facility]

## 2024-04-11 NOTE — ANESTHESIA PREPROCEDURE EVALUATION
Anesthesia Pre-Procedure Evaluation    Patient: Thao Majano   MRN: [id number] : 1950        Procedure : Procedure(s):  ROBOTIC ASSISTED TOTAL LAPAROSCOPIC HYSTERECTOMY , BILATERAL SALPINGO OOPHORECTOMY , PELVIC WASHINGS , POSSIBLE CANCER STAGING          Past Medical History:   Diagnosis Date    Fibromyalgia     Malignant neoplasm of right ovary (H)     Mixed incontinence urge and stress     Obese     Onychomycosis     Transient cerebral ischemia       Past Surgical History:   Procedure Laterality Date    COLONOSCOPY      DENTAL SURGERY      HEMORRHOIDECTOMY      HYSTEROSCOPY      WRIST FRACTURE SURGERY        Allergies   Allergen Reactions    Microplegia Msa-Msg [Plegisol] Other (See Comments)     Edema and headache    Rosuvastatin Muscle Pain (Myalgia)      Social History     Tobacco Use    Smoking status: Never     Passive exposure: Past    Smokeless tobacco: Never   Substance Use Topics    Alcohol use: Yes     Comment: 2 glasses a month      Wt Readings from Last 1 Encounters:   24 115.8 kg (255 lb 6.4 oz)        Anesthesia Evaluation   Pt has had prior anesthetic.         ROS/MED HX  ENT/Pulmonary:     (+)     JIMBO risk factors,                                   Neurologic:  - neg neurologic ROS     Cardiovascular:       METS/Exercise Tolerance:     Hematologic:       Musculoskeletal: Comment: fibromyalgia      GI/Hepatic:  - neg GI/hepatic ROS     Renal/Genitourinary:       Endo:     (+)               Obesity (BMI 47),       Psychiatric/Substance Use:       Infectious Disease:       Malignancy:       Other:            Physical Exam    Airway        Mallampati: II   TM distance: < 3 FB   Neck ROM: full   Mouth opening: > 3 cm    Respiratory Devices and Support         Dental       (+) Multiple visibly decayed, broken teeth and Removable bridges or other hardware      Cardiovascular          Rhythm and rate: regular and normal     Pulmonary           breath sounds clear to auscultation  "          OUTSIDE LABS:  CBC:   Lab Results   Component Value Date    HGB 13.5 04/11/2024     BMP: No results found for: \"NA\", \"POTASSIUM\", \"CHLORIDE\", \"CO2\", \"BUN\", \"CR\", \"GLC\"  COAGS: No results found for: \"PTT\", \"INR\", \"FIBR\"  POC: No results found for: \"BGM\", \"HCG\", \"HCGS\"  HEPATIC: No results found for: \"ALBUMIN\", \"PROTTOTAL\", \"ALT\", \"AST\", \"GGT\", \"ALKPHOS\", \"BILITOTAL\", \"BILIDIRECT\", \"JC\"  OTHER: No results found for: \"PH\", \"LACT\", \"A1C\", \"RENEA\", \"PHOS\", \"MAG\", \"LIPASE\", \"AMYLASE\", \"TSH\", \"T4\", \"T3\", \"CRP\", \"SED\"    Anesthesia Plan    ASA Status:  3       Anesthesia Type: General.     - Airway: ETT         Techniques and Equipment:     - Lines/Monitors: 2nd IV     Consents    Anesthesia Plan(s) and associated risks, benefits, and realistic alternatives discussed. Questions answered and patient/representative(s) expressed understanding.     - Discussed: Risks, Benefits and Alternatives for BOTH SEDATION and the PROCEDURE were discussed     - Discussed with:  Patient            Postoperative Care            Comments:               Marjorie Jordan MD    I have reviewed the pertinent notes and labs in the chart from the past 30 days and (re)examined the patient.  Any updates or changes from those notes are reflected in this note.             # Drug Induced Platelet Defect: home medication list includes an antiplatelet medication  # Severe Obesity: Estimated body mass index is 46.71 kg/m  as calculated from the following:    Height as of this encounter: 1.575 m (5' 2\").    Weight as of this encounter: 115.8 kg (255 lb 6.4 oz).      "

## 2024-04-11 NOTE — ANESTHESIA PROCEDURE NOTES
Airway       Patient location during procedure: OR       Procedure Start/Stop Times: 4/11/2024 10:13 AM  Staff -        Anesthesiologist:  Marjorie Jordan MD       CRNA: Yaneth Lima APRN CRNA       Other Anesthesia Staff: Sheila Hurley RN       Performed By: SRNA  Consent for Airway        Urgency: elective  Indications and Patient Condition       Indications for airway management: dequan-procedural       Induction type:intravenous       Mask difficulty assessment: 3 - difficult mask (inadequate, unstable, or two providers) +/- NMBA    Final Airway Details       Final airway type: endotracheal airway       Successful airway: ETT - single and Oral  Endotracheal Airway Details        ETT size (mm): 7.0       Cuffed: yes       Successful intubation technique: video laryngoscopy       VL Blade Size: Glidescope 3       Grade View of Cords: 1       Adjucts: stylet       Position: Right       Measured from: gums/teeth       Secured at (cm): 21       Bite block used: None    Post intubation assessment        Placement verified by: capnometry, equal breath sounds and chest rise        Number of attempts at approach: 1       Number of other approaches attempted: 0       Secured with: tape       Ease of procedure: easy       Dentition: Intact and Unchanged    Medication(s) Administered   Medication Administration Time: 4/11/2024 10:13 AM

## 2024-04-12 LAB
CREAT SERPL-MCNC: 0.69 MG/DL (ref 0.51–0.95)
EGFRCR SERPLBLD CKD-EPI 2021: 90 ML/MIN/1.73M2
GLUCOSE BLDC GLUCOMTR-MCNC: 106 MG/DL (ref 70–99)
INHIBIN A SERPL-MCNC: <5 PG/ML
INHIBIN B SERPL IA-MCNC: <10 PG/ML

## 2024-04-12 PROCEDURE — 250N000013 HC RX MED GY IP 250 OP 250 PS 637: Performed by: PHYSICIAN ASSISTANT

## 2024-04-12 PROCEDURE — 96372 THER/PROPH/DIAG INJ SC/IM: CPT | Performed by: PHYSICIAN ASSISTANT

## 2024-04-12 PROCEDURE — 250N000011 HC RX IP 250 OP 636: Performed by: PHYSICIAN ASSISTANT

## 2024-04-12 PROCEDURE — 82962 GLUCOSE BLOOD TEST: CPT

## 2024-04-12 RX ADMIN — ACETAMINOPHEN 1000 MG: 500 TABLET, FILM COATED ORAL at 08:54

## 2024-04-12 RX ADMIN — ACETAMINOPHEN 1000 MG: 500 TABLET, FILM COATED ORAL at 14:47

## 2024-04-12 RX ADMIN — DOCUSATE SODIUM 50 MG AND SENNOSIDES 8.6 MG 1 TABLET: 8.6; 5 TABLET, FILM COATED ORAL at 08:54

## 2024-04-12 RX ADMIN — IBUPROFEN 600 MG: 600 TABLET ORAL at 08:54

## 2024-04-12 RX ADMIN — ACETAMINOPHEN 1000 MG: 500 TABLET, FILM COATED ORAL at 02:41

## 2024-04-12 RX ADMIN — ACETAMINOPHEN 1000 MG: 500 TABLET, FILM COATED ORAL at 21:09

## 2024-04-12 RX ADMIN — IBUPROFEN 600 MG: 600 TABLET ORAL at 21:09

## 2024-04-12 RX ADMIN — IBUPROFEN 600 MG: 600 TABLET ORAL at 14:47

## 2024-04-12 RX ADMIN — DOCUSATE SODIUM 50 MG AND SENNOSIDES 8.6 MG 1 TABLET: 8.6; 5 TABLET, FILM COATED ORAL at 21:09

## 2024-04-12 RX ADMIN — OXYCODONE HYDROCHLORIDE 5 MG: 5 TABLET ORAL at 14:50

## 2024-04-12 RX ADMIN — IBUPROFEN 600 MG: 600 TABLET ORAL at 02:57

## 2024-04-12 RX ADMIN — HEPARIN SODIUM 5000 UNITS: 5000 INJECTION, SOLUTION INTRAVENOUS; SUBCUTANEOUS at 10:23

## 2024-04-12 ASSESSMENT — ACTIVITIES OF DAILY LIVING (ADL)
ADLS_ACUITY_SCORE: 31
ADLS_ACUITY_SCORE: 31
ADLS_ACUITY_SCORE: 33
ADLS_ACUITY_SCORE: 30
ADLS_ACUITY_SCORE: 30
ADLS_ACUITY_SCORE: 33
ADLS_ACUITY_SCORE: 33
ADLS_ACUITY_SCORE: 30
ADLS_ACUITY_SCORE: 33
ADLS_ACUITY_SCORE: 31
ADLS_ACUITY_SCORE: 33
ADLS_ACUITY_SCORE: 31
ADLS_ACUITY_SCORE: 33
ADLS_ACUITY_SCORE: 31
ADLS_ACUITY_SCORE: 33
ADLS_ACUITY_SCORE: 31
ADLS_ACUITY_SCORE: 33
ADLS_ACUITY_SCORE: 31
ADLS_ACUITY_SCORE: 33
ADLS_ACUITY_SCORE: 33

## 2024-04-12 NOTE — PLAN OF CARE
Goal Outcome Evaluation:      Plan of Care Reviewed With: patient      Shift: 1900-0730    Summary: POD#1 Laparoscopic hysterectomy, radha salpingo oopherectomy, pelvic washing  Orientation: A&O x4  Vitals/Tele: VSS on RA  Pain management: Sched Tylenol/toradol.  IV Access/drains: PIV infusing NS @75ml/hr.Barr in place  Diet: Full liq  Mobility: Ax1 GBW.  GI/: Cont Bowel. No BM this shift.   Wound/Skin: 5 Abd lap sites.Rash radha under breasts. Scattered bruises.  Discharge Plan:Pending  Other Import Info: Pt was able to dangle and stand at bedside but said she was tired to take a walk.

## 2024-04-12 NOTE — PROGRESS NOTES
Nursing note  Pt a/o x 4, but forgetful. POD 0 of robotic assisted total laparoscopic hysterectomy, bilateral TAVO. Lap sites x 5, covered with derma bound, only one site is bleeding, dressing applied. removal of pelvic mass, pelvic washings. Pt doing ok. Pt c/o pain rating it 8/10, prn iv dilaudid given with relief. Washed pt up with assist of 2 other staffs, skin check down with an RN. Scattered bruises,ecchymosis, rashes under abdominal fold and under the breasts. PIV x 2 on the right with ivf (NS 0.9%)infusing @ 75 ml/hr. BLE's edema 2+ and slightly reddened. Redness on the coccyx. Pt on 2 L of 02 via NC, capno on. Barr patent. Pt denies any n/v, tolerating full liquid diet. Pt did not get OOB. Will continue to monitor.  .Lin Albert RN

## 2024-04-12 NOTE — PROVIDER NOTIFICATION
Pt has developed a large bruise on upper right arm. Parisa Woodall notified. Area marked and will monitor for any changes. No new orders at this time.    At 441 Parisa was re contacted. Pt feels like bruise is worse, more pain reported, asked about possible need for ultrasound, Per Parisa hematoma or clot would be superficial. Continue to monitor no new orders and MD will see pt in am.

## 2024-04-12 NOTE — PROGRESS NOTES
[facility]    Oncology Progress Note     Assessment & Plan   Thao Majano is a 73 year old female who was admitted on 4/11/2024 for a pelvic mass.   She is POD #1 from ROBOTIC-ASSISTED TOTAL LAPAROSCOPIC HYSTERECTOMY, BILATERAL SALPINGO-OOPHORECTOMY, REMOVAL OF PELVIC MASS, PELVIC WASHINGS   Final pathology still pending.     Post - op:  Incisions look good. Would likely benefit from abdominal binder prior to discharge.     Bowel:  Tolerating regular diet, but has not passed gas or had BM as yet. Bowel regimen is in place.     DVT prophylaxis:  On subcutaneous heparin while inpatient.     Barr:  This was removed today, she has not urinated yet.  IV still in, will stop this now.     Disposition: Wishes to discharge to home, but lives alone in independent living and cannot ambulate well enough for self-cares yet. Unable to stool independently due to weakness, pain and body habitus. PT ordered.  Does not qualify for TCU unless she has had 3+ nights in hospital.     Parisa Woodall, APRN   [facility]   [phone]        Active Problems:    Uterine fibroid      # Pain Assessment:  Interval History   She is sitting in bed, resting comfortably.  Tolerating regular food w/out nausea or vomiting, but has not yet passed gas or stool.  Has not voided as yet, either. Pain well controlled, but unable to tolerate sitting in chair longer than 3 hours yesterday.  Unable to ambulate without assistance.     Physical Exam   Temp: 98  F (36.7  C) Temp src: Oral BP: 112/60 Pulse: 77   Resp: 18 SpO2: 99 % O2 Device: Nasal cannula Oxygen Delivery: 1 LPM  Vitals:    04/11/24 0825   Weight: 115.8 kg (255 lb 6.4 oz)     Vital Signs with Ranges  Temp:  [97.2  F (36.2  C)-98.2  F (36.8  C)] 98  F (36.7  C)  Pulse:  [67-80] 77  Resp:  [7-18] 18  BP: (112-180)/() 112/60  SpO2:  [98 %-100 %] 99 %  I/O last 3 completed shifts:  In: 1833.44 [P.O.:310; I.V.:1523.44]  Out: 1750 [Urine:1450;  Blood:300]    GENERAL: Cheerful, alert, NAD  CV: RRR, no murmurs  RESPIRATORY: no dyspnea, clear bilat  ABDOMEN: non-distended, very few hypoactive BS, soft, non-tender, incisions c/d/i, no signs of infection  EXTREMITY: no edema, neg Lilibeth's  SKIN: warm and dry, no jaundice no rashes  NEURO: cranial nerves II-XII grossly intact, motor exam intact    Medications   Current Facility-Administered Medications   Medication Dose Route Frequency Provider Last Rate Last Admin    sodium chloride 0.9 % infusion   Intravenous Continuous Lilliana Caldwell PA-C 75 mL/hr at 04/12/24 1000 Rate Verify at 04/12/24 1000     Current Facility-Administered Medications   Medication Dose Route Frequency Provider Last Rate Last Admin    acetaminophen (TYLENOL) tablet 1,000 mg  1,000 mg Oral Q6H Lilliana Caldwell PA-C   1,000 mg at 04/12/24 0854    heparin ANTICOAGULANT injection 5,000 Units  5,000 Units Subcutaneous Q8H Lilliana Caldwell PA-C   5,000 Units at 04/12/24 1023    ketorolac (TORADOL) injection 15 mg  15 mg Intravenous Q6H Lilliana Caldwell PA-C   15 mg at 04/11/24 2126    Or    ibuprofen (ADVIL/MOTRIN) tablet 600 mg  600 mg Oral Q6H Lilliana Caldwell PA-C   600 mg at 04/12/24 0854    senna-docusate (SENOKOT-S/PERICOLACE) 8.6-50 MG per tablet 1 tablet  1 tablet Oral BID Lilliana Caldwell PA-C   1 tablet at 04/12/24 0854    sodium chloride (PF) 0.9% PF flush 3 mL  3 mL Intracatheter Q8H Lilliana Caldwell PA-C   3 mL at 04/12/24 0243       Data   Results for orders placed or performed during the hospital encounter of 04/11/24 (from the past 24 hour(s))   Platelet count   Result Value Ref Range    Platelet Count 211 150 - 450 10e3/uL   Glucose by meter   Result Value Ref Range    GLUCOSE BY METER POCT 106 (H) 70 - 99 mg/dL

## 2024-04-13 ENCOUNTER — APPOINTMENT (OUTPATIENT)
Dept: PHYSICAL THERAPY | Facility: CLINIC | Age: 74
End: 2024-04-13
Attending: NURSE PRACTITIONER
Payer: COMMERCIAL

## 2024-04-13 LAB — GLUCOSE BLDC GLUCOMTR-MCNC: 105 MG/DL (ref 70–99)

## 2024-04-13 PROCEDURE — 97530 THERAPEUTIC ACTIVITIES: CPT | Mod: GP | Performed by: PHYSICAL THERAPIST

## 2024-04-13 PROCEDURE — 97161 PT EVAL LOW COMPLEX 20 MIN: CPT | Mod: GP | Performed by: PHYSICAL THERAPIST

## 2024-04-13 PROCEDURE — 97116 GAIT TRAINING THERAPY: CPT | Mod: GP | Performed by: PHYSICAL THERAPIST

## 2024-04-13 PROCEDURE — 82962 GLUCOSE BLOOD TEST: CPT

## 2024-04-13 PROCEDURE — 250N000013 HC RX MED GY IP 250 OP 250 PS 637: Performed by: PHYSICIAN ASSISTANT

## 2024-04-13 RX ADMIN — ACETAMINOPHEN 1000 MG: 500 TABLET, FILM COATED ORAL at 15:25

## 2024-04-13 RX ADMIN — OXYCODONE HYDROCHLORIDE 5 MG: 5 TABLET ORAL at 15:28

## 2024-04-13 RX ADMIN — DOCUSATE SODIUM 50 MG AND SENNOSIDES 8.6 MG 1 TABLET: 8.6; 5 TABLET, FILM COATED ORAL at 08:44

## 2024-04-13 RX ADMIN — IBUPROFEN 600 MG: 600 TABLET ORAL at 02:51

## 2024-04-13 RX ADMIN — IBUPROFEN 600 MG: 600 TABLET ORAL at 08:44

## 2024-04-13 RX ADMIN — IBUPROFEN 600 MG: 600 TABLET ORAL at 15:25

## 2024-04-13 RX ADMIN — DOCUSATE SODIUM 50 MG AND SENNOSIDES 8.6 MG 1 TABLET: 8.6; 5 TABLET, FILM COATED ORAL at 20:19

## 2024-04-13 RX ADMIN — ACETAMINOPHEN 1000 MG: 500 TABLET, FILM COATED ORAL at 08:44

## 2024-04-13 RX ADMIN — ACETAMINOPHEN 1000 MG: 500 TABLET, FILM COATED ORAL at 02:50

## 2024-04-13 RX ADMIN — IBUPROFEN 600 MG: 600 TABLET ORAL at 20:19

## 2024-04-13 RX ADMIN — ACETAMINOPHEN 1000 MG: 500 TABLET, FILM COATED ORAL at 20:19

## 2024-04-13 ASSESSMENT — ACTIVITIES OF DAILY LIVING (ADL)
ADLS_ACUITY_SCORE: 33
ADLS_ACUITY_SCORE: 33
ADLS_ACUITY_SCORE: 32
ADLS_ACUITY_SCORE: 33
ADLS_ACUITY_SCORE: 32
ADLS_ACUITY_SCORE: 33

## 2024-04-13 NOTE — PROGRESS NOTES
[facility]    Oncology Progress Note     Assessment & Plan   Thao Majano is a 73 year old female who was admitted on 4/11/2024 for a pelvic mass.   She is POD #2 from ROBOTIC-ASSISTED TOTAL LAPAROSCOPIC HYSTERECTOMY, BILATERAL SALPINGO-OOPHORECTOMY, REMOVAL OF PELVIC MASS, PELVIC WASHINGS   Final pathology still pending. Intraoperative frozen section analysis consistent with benign etiologies of pelvic mass and endometrium.    Post - op: Incisions clean/dry/intact without erythema/induration/drainage. Continue abdominal binder. Continue PO pain regimen with scheduled PO tylenol/ibuprofen & PO oxycodone PRN.     Bowel:  Tolerating regular diet, but has not passed gas or had BM as yet. Bowel regimen is in place.     DVT prophylaxis: MIS surgery & no evidence of cancer. Patient is having bruising around IV sites & nervous about AC. Will discontinue subcutaneous heparin & discussed with patient she will have to ambulate and move around.    Barr: removed on POD#1. Baseline incontinence.    Disposition: awaiting PT/OT recommendations. Has weakness & deconditioned & worried about falls. May require TCU, however, awaiting formal recommendations.       Hiral Arroyo MD  Gynecologic Oncology  [facility]  04/13/24              Active Problems:    Uterine fibroid      # Pain Assessment:  Interval History   Patient reports pain is well-controlled. Denies fevers/chills. Has bruising around IV sites & made her nervous about AC, so, refused heparin, yesterday. Will plan on discontinuation, but, encouraged to walk. Also hoping to have BS discontinued. Tolerating regular diet. Really worried about falling if she goes home. Feels weak and a little unsteady. Having some vaginal spotting. Aware this is normal. Really happy that no cancer was found & very happy with surgery & results.    Physical Exam   Temp: 98  F (36.7  C) Temp src: Oral BP: (!) 155/81 Pulse: 89   Resp: 20  SpO2: 93 % O2 Device: None (Room air)    Vitals:    04/11/24 0825   Weight: 115.8 kg (255 lb 6.4 oz)     Vital Signs with Ranges  Temp:  [97.9  F (36.6  C)-98.3  F (36.8  C)] 98  F (36.7  C)  Pulse:  [82-89] 89  Resp:  [18-20] 20  BP: (119-155)/(63-81) 155/81  SpO2:  [91 %-95 %] 93 %  I/O last 3 completed shifts:  In: 1080 [P.O.:1080]  Out: 800 [Urine:800]    GENERAL: cheerful, alert, NAD  CV: RRR, no murmurs  RESPIRATORY: no dyspnea, clear bilat  ABDOMEN: non-distended, normoactive BS, soft, non-tender, incisions c/d/i, no signs of infection  EXTREMITY: no edema, neg Lilibeth's  SKIN: warm and dry, no jaundice no rashes, superficial bruising around IV sites; no palpable cords  NEURO: no focal deficits    Medications   Current Facility-Administered Medications   Medication Dose Route Frequency Provider Last Rate Last Admin     Current Facility-Administered Medications   Medication Dose Route Frequency Provider Last Rate Last Admin    acetaminophen (TYLENOL) tablet 1,000 mg  1,000 mg Oral Q6H Lilliana Caldwell PA-C   1,000 mg at 04/13/24 0844    heparin ANTICOAGULANT injection 5,000 Units  5,000 Units Subcutaneous Q8H Lilliana Caldwell PA-C   5,000 Units at 04/12/24 1023    ketorolac (TORADOL) injection 15 mg  15 mg Intravenous Q6H Lilliana Caldwell PA-C   15 mg at 04/11/24 2126    Or    ibuprofen (ADVIL/MOTRIN) tablet 600 mg  600 mg Oral Q6H Lilliana Caldwell PA-C   600 mg at 04/13/24 0844    senna-docusate (SENOKOT-S/PERICOLACE) 8.6-50 MG per tablet 1 tablet  1 tablet Oral BID Lilliana Caldwell PA-C   1 tablet at 04/13/24 0844    sodium chloride (PF) 0.9% PF flush 3 mL  3 mL Intracatheter Q8H Lilliana Caldwell PA-C   3 mL at 04/13/24 0612       Data   Results for orders placed or performed during the hospital encounter of 04/11/24 (from the past 24 hour(s))   Glucose by meter   Result Value Ref Range    GLUCOSE BY METER POCT 105 (H) 70 - 99 mg/dL

## 2024-04-13 NOTE — PLAN OF CARE
Goal Outcome Evaluation:      Plan of Care Reviewed With: patient    Date & Time: 4/13/24 1435  Surgery/POD#: day2 hysterectomy BSO removal of pelvic mass  Behavior & Aggression: calm cooperative   Fall Risk: yes  Orientation:alert and oriented  ABNL VS/O2:  ABNL Labs:   Pain Management:pain well controled with schedule tylenol and Ibuprofen   Bowel/Bladder: goo urine output passing flatus, did have small BM this shift  Drains:   Wounds/incisions lap sites clean dry and intact   Diet:regular  Activity Level: up with assist of one with walker and gait belt  Tests/Procedures:   Anticipated  DC Date:   Significant Information: bruising noted on right upper arm and left wrist

## 2024-04-13 NOTE — PLAN OF CARE
Goal Outcome Evaluation:      Plan of Care Reviewed With: patient, family    Date & Time: 4/12/24 1900  Surgery/POD#: day 1 lap assisted total hysterectomy BSO removal of pelvic mass  Behavior & Aggression: calm cooperative  Fall Risk: yes  Orientation:alert and oriented   ABNL VS/O2:  ABNL Labs:   Pain Management:pain well controled with oral pain medications   Bowel/Bladder: starks removed good urine output. No BM this shift  Drains:   Wounds/incisions lap sites clean dry and intact. Bruising  noted on left wrist and right upper arm, MD notifed  Diet:regular  Activity Level: up with one with walker and gait belt  Tests/Procedures:   Anticipated  DC Date:   Significant Information: pt refused heparin injection this pm concerned with bruising.

## 2024-04-13 NOTE — PROGRESS NOTES
Diagnosis: ROBOTIC-ASSISTED TOTAL LAPAROSCOPIC HYSTERECTOMY, BILATERAL SALPINGO-OOPHORECTOMY, REMOVAL OF PELVIC MASS, PELVIC WASHINGS     POD#:1  Mental Status:A & O x 4  Activity/dangle: Up with SBA and walker  Diet:Regular  Pain:tylenol for pain  Barr/Voiding:Voiding in BR  Tele/Restraints/Iso:NA  02/LDA:ASHLEY MCKEON  D/C Date:Pending  Other Info:1 small BM tonight. Scattered Bruises. Edema in BLE..

## 2024-04-13 NOTE — PROGRESS NOTES
04/13/24 1638   Appointment Info   Signing Clinician's Name / Credentials (PT) Samantha Willingham PT   Living Environment   People in Home facility resident   Current Living Arrangements apartment  (senior IND living apt)   Home Accessibility no concerns   Transportation Anticipated car, drives self;family or friend will provide   Living Environment Comments Pt lives in IND sr apt, dtr works in the building and is involved in pt's day to day.   Self-Care   Usual Activity Tolerance good   Current Activity Tolerance fair   Regular Exercise No   Equipment Currently Used at Home walker, rolling   Fall history within last six months no   Activity/Exercise/Self-Care Comment amb with 4wh walker at all times, limited distance   General Information   Onset of Illness/Injury or Date of Surgery 04/11/24   Referring Physician Parisa Woodall NP   Patient/Family Therapy Goals Statement (PT) hopes home after TCU soon   Pertinent History of Current Problem (include personal factors and/or comorbidities that impact the POC) Pt admitted for hysterectomy BSO removal pelvic mass. PMH includes obesity, fall in 2009 with fracture R wrist.   Existing Precautions/Restrictions fall;abdominal   Cognition   Affect/Mental Status (Cognition) WNL   Orientation Status (Cognition) oriented x 3   Pain Assessment   Patient Currently in Pain Yes, see Vital Sign flowsheet   Integumentary/Edema   Integumentary/Edema Comments abd incision, not visualized   Posture    Posture Forward head position   Range of Motion (ROM)   Range of Motion ROM deficits secondary to surgical procedure;ROM deficits secondary to pain   Strength (Manual Muscle Testing)   Strength (Manual Muscle Testing) Deficits observed during functional mobility   Bed Mobility   Comment, (Bed Mobility) pt  unable to sit up on EOB without assist   Transfers   Comment, (Transfers) sit to stand with min assist 1   Gait/Stairs (Locomotion)   Comment, (Gait/Stairs) gait with 4 wh walker and min assist  1 5'   Balance   Balance Comments unsteady standing balance   Sensory Examination   Sensory Perception patient reports no sensory changes   Coordination   Coordination no deficits were identified   Muscle Tone   Muscle Tone no deficits were identified   Clinical Impression   Criteria for Skilled Therapeutic Intervention Yes, treatment indicated   PT Diagnosis (PT) impaired functional mobility   Influenced by the following impairments pain, genl deconditioning and weakness   Functional limitations due to impairments fall risk, decreased IND with all mobility   Clinical Presentation (PT Evaluation Complexity) stable   Clinical Presentation Rationale per medical record   Clinical Decision Making (Complexity) low complexity   Planned Therapy Interventions (PT) balance training;gait training;transfer training;strengthening   Risk & Benefits of therapy have been explained evaluation/treatment results reviewed;care plan/treatment goals reviewed;risks/benefits reviewed;current/potential barriers reviewed;participants voiced agreement with care plan;participants included;patient;daughter   PT Total Evaluation Time   PT Eval, Low Complexity Minutes (99207) 15   Physical Therapy Goals   PT Frequency Daily   PT Predicted Duration/Target Date for Goal Attainment 04/23/24   PT Goals Bed Mobility;Transfers;Gait   PT: Bed Mobility Supervision/stand-by assist;Supine to/from sit;Within precautions  (abdominal)   PT: Transfers Supervision/stand-by assist;Sit to/from stand;Bed to/from chair;Assistive device   PT: Gait Supervision/stand-by assist;Rolling walker;100 feet   Interventions   Interventions Quick Adds Gait Training;Therapeutic Activity   Therapeutic Activity   Therapeutic Activities: dynamic activities to improve functional performance Minutes (18640) 10   Symptoms Noted During/After Treatment Fatigue;Increased pain   Treatment Detail/Skilled Intervention Educated pt regarding abdominal precautions, logrolling for supine to  "sit, pt able to perform with mod assist 1, still painful. Got pain meds 30 min ago, rates pain 5/10 with movement. Sat EOB with supervision, sit to stand with walker and min assist 1, noted pt's 4 wh walker brakes are not very tight and walker moves with them on. Back to bed with mod assist 1 after gait, positioned for comfort with min assist.   Gait Training   Gait Training Minutes (87443) 9   Symptoms Noted During/After Treatment (Gait Training) fatigue;increased pain   Treatment Detail/Skilled Intervention gt training with pts own 4wh walker and min assist 1, unsteady, pt reports LEs \"feel like jello,\" decreased activity ashley noted today, pt amb 20'. Min assist needed to help slow down walker and keep it steady. 4wh walker is too much freedom right now will try front wh walker tomorrow.   Distance in Feet 20'   [place] Level (Gait Training) minimum assist (75% patient effort)   Physical Assistance Level (Gait Training) 1 person assist   PT Discharge Planning   PT Plan gait with w/c follow, try front wh walker tomorrow.   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 Pt is currently weak and painful, amb 20' with wh walker and min assist, mod assist for transfers. Rec TCU at discharge, pt and family in agreement.   PT Brief overview of current status assist 1 for transfers, gait short distance with walker and min assist 1.   Total Session Time   Timed Code Treatment Minutes 19   Total Session Time (sum of timed and untimed services) 34     [facility]  OUTPATIENT PHYSICAL THERAPY EVALUATION  PLAN OF TREATMENT FOR OUTPATIENT REHABILITATION  (COMPLETE FOR INITIAL CLAIMS ONLY)  Patient's Last Name, First Name, " M.I.  YOB: 1950  Thao Majano                        Provider's Name  [facility] Medical Record No.  [id number]                             Onset Date:  04/11/24   Start of Care Date:      Type:     _X_PT   ___OT   ___SLP Medical Diagnosis:                 PT Diagnosis:  impaired functional mobility Visits from SOC:  1     See note for plan of treatment, functional goals and certification details    I CERTIFY THE NEED FOR THESE SERVICES FURNISHED UNDER        THIS PLAN OF TREATMENT AND WHILE UNDER MY CARE     (Physician co-signature of this document indicates review and certification of the therapy plan).

## 2024-04-13 NOTE — PROGRESS NOTES
Patient VSS are at baseline: Yes    Patient able to ambulate as they were prior to admission or with assist devices provided by therapies during their stay: Yes    Patient able to tolerate oral intake and maintain hydration status; Yes    Patient has adequate pain control using oral analgesics :Yes    Patient must be voiding adequate amounts : Yes    Hypercapnia, Hypoventilation or hypoxia resolved for at least 2 hours without supplemental oxygen: Yes, but dyspnia with exertion    Deficits in sensation, mobility or coordination have resolved if spinal or regional anesthesia used Yes    Patient has returned to baseline mental status Yes

## 2024-04-14 ENCOUNTER — APPOINTMENT (OUTPATIENT)
Dept: PHYSICAL THERAPY | Facility: CLINIC | Age: 74
End: 2024-04-14
Attending: OBSTETRICS & GYNECOLOGY
Payer: COMMERCIAL

## 2024-04-14 ENCOUNTER — APPOINTMENT (OUTPATIENT)
Dept: OCCUPATIONAL THERAPY | Facility: CLINIC | Age: 74
End: 2024-04-14
Attending: OBSTETRICS & GYNECOLOGY
Payer: COMMERCIAL

## 2024-04-14 LAB — PLATELET # BLD AUTO: 203 10E3/UL (ref 150–450)

## 2024-04-14 PROCEDURE — 250N000013 HC RX MED GY IP 250 OP 250 PS 637: Performed by: PHYSICIAN ASSISTANT

## 2024-04-14 PROCEDURE — 97530 THERAPEUTIC ACTIVITIES: CPT | Mod: GP | Performed by: PHYSICAL THERAPIST

## 2024-04-14 PROCEDURE — 97116 GAIT TRAINING THERAPY: CPT | Mod: GP | Performed by: PHYSICAL THERAPIST

## 2024-04-14 PROCEDURE — 36415 COLL VENOUS BLD VENIPUNCTURE: CPT | Performed by: PHYSICIAN ASSISTANT

## 2024-04-14 PROCEDURE — 85049 AUTOMATED PLATELET COUNT: CPT | Performed by: PHYSICIAN ASSISTANT

## 2024-04-14 PROCEDURE — 97535 SELF CARE MNGMENT TRAINING: CPT | Mod: GO

## 2024-04-14 PROCEDURE — 97165 OT EVAL LOW COMPLEX 30 MIN: CPT | Mod: GO

## 2024-04-14 PROCEDURE — 250N000011 HC RX IP 250 OP 636: Performed by: PHYSICIAN ASSISTANT

## 2024-04-14 RX ADMIN — ACETAMINOPHEN 1000 MG: 500 TABLET, FILM COATED ORAL at 03:44

## 2024-04-14 RX ADMIN — IBUPROFEN 600 MG: 600 TABLET ORAL at 08:15

## 2024-04-14 RX ADMIN — IBUPROFEN 600 MG: 600 TABLET ORAL at 03:44

## 2024-04-14 RX ADMIN — KETOROLAC TROMETHAMINE 15 MG: 15 INJECTION, SOLUTION INTRAMUSCULAR; INTRAVENOUS at 20:17

## 2024-04-14 RX ADMIN — DOCUSATE SODIUM 50 MG AND SENNOSIDES 8.6 MG 1 TABLET: 8.6; 5 TABLET, FILM COATED ORAL at 08:16

## 2024-04-14 RX ADMIN — IBUPROFEN 600 MG: 600 TABLET ORAL at 14:59

## 2024-04-14 RX ADMIN — OXYCODONE HYDROCHLORIDE 5 MG: 5 TABLET ORAL at 14:58

## 2024-04-14 RX ADMIN — DOCUSATE SODIUM 50 MG AND SENNOSIDES 8.6 MG 1 TABLET: 8.6; 5 TABLET, FILM COATED ORAL at 20:16

## 2024-04-14 ASSESSMENT — ACTIVITIES OF DAILY LIVING (ADL)
ADLS_ACUITY_SCORE: 34
ADLS_ACUITY_SCORE: 33
ADLS_ACUITY_SCORE: 34
ADLS_ACUITY_SCORE: 33
ADLS_ACUITY_SCORE: 33
ADLS_ACUITY_SCORE: 34
ADLS_ACUITY_SCORE: 33
ADLS_ACUITY_SCORE: 34
ADLS_ACUITY_SCORE: 33
ADLS_ACUITY_SCORE: 34
ADLS_ACUITY_SCORE: 33
ADLS_ACUITY_SCORE: 34
ADLS_ACUITY_SCORE: 34
ADLS_ACUITY_SCORE: 33
ADLS_ACUITY_SCORE: 33
ADLS_ACUITY_SCORE: 34

## 2024-04-14 NOTE — PROGRESS NOTES
[facility]    Oncology Progress Note     Assessment & Plan   Thao Majano is a 73 year old female who was admitted on 4/11/2024 for a pelvic mass.   She is POD#3 from ROBOTIC-ASSISTED TOTAL LAPAROSCOPIC HYSTERECTOMY, BILATERAL SALPINGO-OOPHORECTOMY, REMOVAL OF PELVIC MASS, PELVIC WASHINGS   Final pathology still pending. Intraoperative frozen section analysis consistent with benign etiologies of pelvic mass and endometrium.    Postoperative considerations: Incisions clean/dry/intact without erythema/induration/drainage. Continue abdominal binder. Continue PO pain regimen with scheduled PO tylenol/ibuprofen & PO oxycodone PRN.     Bowel: Regular diet. Bowel regimen in place.     DVT prophylaxis: MIS surgery & no evidence of cancer. Will not use AC while inpatient, as patient worried about bruising.    Barr: Removed on POD#1. Baseline incontinence, but, improving, postoperatively.    Disposition: Will require TCU placement. Meeting postoperative milestones. From a surgical standpoint, could be ready to discharge on Monday, 4/15/2024. However, patient aware this could take longer just from a logistics standpoint and need for TCU placement, etc. SW to see patient on 4/15/2024 to start with the arrangements.      Hiral Arroyo MD  Gynecologic Oncology  [facility]  04/13/24      Active Problems:    Uterine fibroid      # Pain Assessment:  Interval History   Patient reports        Physical Exam   Temp: 97.8  F (36.6  C) Temp src: Oral BP: (!) 167/99 Pulse: 86   Resp: 16 SpO2: 96 % O2 Device: None (Room air)    Vitals:    04/11/24 0825   Weight: 115.8 kg (255 lb 6.4 oz)     Vital Signs with Ranges  Temp:  [97.8  F (36.6  C)-99.3  F (37.4  C)] 97.8  F (36.6  C)  Pulse:  [83-86] 86  Resp:  [16-18] 16  BP: (103-167)/(69-99) 167/99  SpO2:  [94 %-97 %] 96 %  I/O last 3 completed shifts:  In: 1200 [P.O.:1200]  Out: -     GENERAL: alert, cooperative, conversational, no  acute distress  CV: RRR, no murmurs  RESPIRATORY: no dyspnea, clear bilaterally  ABDOMEN: non-distended, normoactive BS, soft, non-tender, incisions c/d/i, no signs of infection  EXTREMITY: no edema, bilateral SCD's in place  SKIN: warm and dry, no jaundice no rashes, superficial bruising around IV sites; no palpable cords  NEURO: no focal deficits    Medications   Current Facility-Administered Medications   Medication Dose Route Frequency Provider Last Rate Last Admin     Current Facility-Administered Medications   Medication Dose Route Frequency Provider Last Rate Last Admin     ketorolac (TORADOL) injection 15 mg  15 mg Intravenous Q6H Lilliana Caldwell PA-C   15 mg at 04/11/24 2126    Or     ibuprofen (ADVIL/MOTRIN) tablet 600 mg  600 mg Oral Q6H Lilliana Caldwell PA-C   600 mg at 04/14/24 0815     senna-docusate (SENOKOT-S/PERICOLACE) 8.6-50 MG per tablet 1 tablet  1 tablet Oral BID Lilliana Caldwell PA-C   1 tablet at 04/14/24 0816     sodium chloride (PF) 0.9% PF flush 3 mL  3 mL Intracatheter Q8H Lilliana Caldwell PA-C   3 mL at 04/14/24 0816       Data   Results for orders placed or performed during the hospital encounter of 04/11/24 (from the past 24 hour(s))   Platelet count   Result Value Ref Range    Platelet Count 203 150 - 450 10e3/uL

## 2024-04-14 NOTE — PLAN OF CARE
[id number]  POD#3 Laparoscopic hysterectomy, radha salpingo oopherectomy, pelvic washing  Orientation: A&O x4  Vitals/Tele: VSS on RA  Pain management: Sched Tylenol, Ibuprofen.   IV Access/drains: PIV SL. Bruises at IV sites.    Diet: Regular  Mobility: Ax1 GB/W.  GI/: No BM this shift. Voiding in BR  Wound/Skin: 5 Abd lap sites.Rash radha under breasts. Scattered bruises.  Discharge Plan:Pending to TCU  Other Import Info: Pt was able to ambulate to BR x1. Reported about headache but denies interventions. Spotting on pad from vagina.

## 2024-04-14 NOTE — PLAN OF CARE
Pt is AOx4, pain is managed with scheduled meds, abdomen soft, tender, passing gas, BS normoactive, lap sites wnl, smear BMs, small spotting vaginally, voiding, tolerating diet, ambulated in rodriguez assist x1, pending TCU discharge on Monday.

## 2024-04-14 NOTE — PROGRESS NOTES
4/14/24; [id number]    POD #3 Lap Hysterectomy, bilateral salpingo-oophorectomy, removal of pelvic mass, pelvic washings    A&O X4; A1 gb/walker to BR; some pink vaginal discharge noted; lap sites w/ liquid bandage; plan for TCU when ready.

## 2024-04-14 NOTE — PROGRESS NOTES
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 Walk in shower, grab bar, shower chair, long handled sponge, reacher.   Self-Care   Usual Activity Tolerance good   Current Activity Tolerance fair   Regular Exercise No   Equipment Currently Used at Home walker, rolling   Fall history within last six months no   Activity/Exercise/Self-Care Comment amb with 4wh walker at all times, limited distance   Instrumental Activities of Daily Living (IADL)   IADL Comments Pt drives, microwave meals/crockpot   General Information   Onset of Illness/Injury or Date of Surgery 04/11/24   Referring Physician Hiral Arroyo MD   Patient/Family Therapy Goal Statement (OT) To get stronger   Additional Occupational Profile Info/Pertinent History of Current Problem Per chart: \"Thao Majano is a 73 year old female who was admitted on 4/11/2024 for a pelvic mass.   She is POD #3 from ROBOTIC-ASSISTED TOTAL LAPAROSCOPIC HYSTERECTOMY, BILATERAL SALPINGO-OOPHORECTOMY, REMOVAL OF PELVIC MASS, PELVIC WASHINGS.\"   Existing Precautions/Restrictions fall;abdominal   General Observations and Info Pt with noted BLE swelling, pt states \"I have tried 10 different types of wraps and I can't get them on myself\"   Cognitive Status Examination   Orientation Status orientation to person, place and time   Visual Perception   Visual Impairment/Limitations WFL;corrective lenses for reading   Sensory   Sensory Comments Pt does not report numbness/tingling   Range of Motion Comprehensive   Comment, General Range of Motion BUEs WFL   Strength Comprehensive (MMT) "   Comment, General Manual Muscle Testing (MMT) Assessment BUEs WFL   Coordination   Upper Extremity Coordination No deficits were identified   Bed Mobility   Comment (Bed Mobility) NT - pt up in chair at time of OT session   Transfers   Transfers sit-stand transfer;toilet transfer   Sit-Stand Transfer   Sit/Stand Transfer Comments CGA   Toilet Transfer   Toilet Transfer Comments Min A   Balance   Balance Comments No overt LOB noted   Activities of Daily Living   BADL Assessment/Intervention lower body dressing;toileting;grooming   Lower Body Dressing Assessment/Training   Comment, (Lower Body Dressing) Min A   Grooming Assessment/Training   Comment, (Grooming) SBA   Toileting   Comment, (Toileting) Mod A for dequan-cares posteriorly1   Clinical Impression   Criteria for Skilled Therapeutic Interventions Met (OT) Yes, treatment indicated   OT Diagnosis Decreased ind with I/ADLs   OT Problem List-Impairments impacting ADL problems related to;activity tolerance impaired;balance;mobility;strength;pain;post-surgical precautions   Assessment of Occupational Performance 3-5 Performance Deficits   Identified Performance Deficits dressing, bathing, toileting, IADLs   Planned Therapy Interventions (OT) ADL retraining;IADL retraining;transfer training   Clinical Decision Making Complexity (OT) problem focused assessment/low complexity   Risk & Benefits of therapy have been explained patient   OT Total Evaluation Time   OT Eval, Low Complexity Minutes (31430) 10   OT Goals   Therapy Frequency (OT) 5 times/week   OT Predicted Duration/Target Date for Goal Attainment 04/24/24   OT Goals Hygiene/Grooming;Upper Body Dressing;Lower Body Dressing;Toilet Transfer/Toileting   OT: Hygiene/Grooming while standing;modified independent;within precautions  (FWW vs 4WW)   OT: Upper Body Dressing Modified independent;within precautions   OT: Lower Body Dressing Modified independent;within precautions;using adaptive equipment  (FWW vs 4WW, using  AE)   OT: Toilet Transfer/Toileting Modified independent;toilet transfer;cleaning and garment management;using adaptive equipment;within precautions  (FWW vs 4WW)   Self-Care/Home Management   Self-Care/Home Mgmt/ADL, Compensatory, Meal Prep Minutes (39728) 23   Symptoms Noted During/After Treatment (Meal Preparation/Planning Training) fatigue;increase work of breath   Treatment Detail/Skilled Intervention Pt greeted in chair, agreeable to OT. Pt with noted BLE swelling, pt reports she has tried wraps in past but has difficulty managing them ind'ly. Pt with Dep A to adjust B socks - reports she has difficulty with this. Pt stands from chair with CGA and FWW. Pt ambulates to BR with CGA and FWW, VCs for walker safety/keep walker close, cues for posture. Pt completes toilet transfer with Min A and FWW, heavy use of grab bars, assist for controlled sit. Pt edu on brief change in BR with use of reacher, provided pt with demo. Pt changes brief with Min A to pull up over B hips in standing with FW, using reacher. Pt voids in toilet with Mod A for dequan-cares/posteriorly. Pt stands from toilet with Min A, FWW, grab bars. Pt ambulates to sink with CGA and FWW and completes g/h task for hand hygiene with SBA and FWW. Pt ambulates back to chair with CGA and FWW and sits in chair with CGA and FWW - VC for controlled sit.   OT Discharge Planning   OT Plan LB dressing AE (pt has her own reacher/bring other AE), g/h sink activity ashley, toileting, monitor BLE swelling   OT Discharge Recommendation (DC Rec) Transitional Care Facility   OT Rationale for DC Rec Pt functioning below baseline, decreased activity ashley, mobility, strength, precautions, impacting I/ADLs. Pt resides alone in an ind living facility. Pt currently Ax 1 for functional mobility and self cares at room level with FWW. Rec skilled TCU for incr I/ADL ind prior to return to PLOF.   OT Brief overview of current status See above   OT Equipment Needed at Discharge other  (see comments)  (Walk in shower, grab bar, shower chair, long handled sponge, reacher (pt has her own in room).)   Total Session Time   Timed Code Treatment Minutes 23   Total Session Time (sum of timed and untimed services) 33    [facility]  OUTPATIENT OCCUPATIONAL THERAPY  EVALUATION  PLAN OF TREATMENT FOR OUTPATIENT REHABILITATION  (COMPLETE FOR INITIAL CLAIMS ONLY)  Patient's Last Name, First Name, M.I.  YOB: 1950  Thao Majano                          Provider's Name  [facility] Medical Record No.  [id number]                             Onset Date:  04/11/24   Start of Care Date:      Type:     ___PT   _X_OT   ___SLP Medical Diagnosis:                       OT Diagnosis:  Decreased ind with I/ADLs Visits from SOC:  1     See note for plan of treatment, functional goals and certification details    I CERTIFY THE NEED FOR THESE SERVICES FURNISHED UNDER        THIS PLAN OF TREATMENT AND WHILE UNDER MY CARE     (Physician co-signature of this document indicates review and certification of the therapy plan).

## 2024-04-14 NOTE — UTILIZATION REVIEW
Concurrent stay review; Secondary Review Determination - [facility]        Under the authority of the Utilization Management Committee, the utilization review process indicated a secondary review on the above patient.  The review outcome is based on review of the medical records, discussions with staff, and applying clinical experience noted on the date of the review.        (x) Outpatient correction status is appropriate       RATIONALE FOR DETERMINATION:   73-year-old female admitted for pelvic mass, status post robotic assisted total laparoscopic hysterectomy, bilateral salpingo-oophorectomy, removal of pelvic mass with pelvic washings.  Is doing well with pain control, tolerating diet, now waiting for placement, does not meet criteria for inpatient stay, recommend half-way care    Patient delayed discharge is related to disposition, there is no medical necessity for inpatient admission at the time of this review. If there is a change in patient status, please resend for review.    The information on this document is developed by the utilization review team in order for the business office to ensure compliance.  This only denotes the appropriateness of proper admission status and does not reflect the quality of care rendered.       The definitions of Inpatient Status and Observation Status used in making the determination above are those provided in the CMS Coverage Manual, Chapter 1 and Chapter 6, section 70.4.       Sincerely,    Kylie Terrell MD

## 2024-04-15 ENCOUNTER — APPOINTMENT (OUTPATIENT)
Dept: PHYSICAL THERAPY | Facility: CLINIC | Age: 74
End: 2024-04-15
Attending: OBSTETRICS & GYNECOLOGY
Payer: COMMERCIAL

## 2024-04-15 VITALS
HEART RATE: 78 BPM | SYSTOLIC BLOOD PRESSURE: 166 MMHG | TEMPERATURE: 97.6 F | OXYGEN SATURATION: 95 % | DIASTOLIC BLOOD PRESSURE: 81 MMHG | WEIGHT: 255.4 LBS | RESPIRATION RATE: 18 BRPM | HEIGHT: 62 IN | BODY MASS INDEX: 47 KG/M2

## 2024-04-15 LAB
PATH REPORT.COMMENTS IMP SPEC: NORMAL
PATH REPORT.FINAL DX SPEC: NORMAL
PATH REPORT.FINAL DX SPEC: NORMAL
PATH REPORT.GROSS SPEC: NORMAL
PATH REPORT.GROSS SPEC: NORMAL
PATH REPORT.INTRAOP OBS SPEC DOC: NORMAL
PATH REPORT.MICROSCOPIC SPEC OTHER STN: NORMAL
PATH REPORT.MICROSCOPIC SPEC OTHER STN: NORMAL
PATH REPORT.RELEVANT HX SPEC: NORMAL
PATH REPORT.RELEVANT HX SPEC: NORMAL
PHOTO IMAGE: NORMAL

## 2024-04-15 PROCEDURE — 97116 GAIT TRAINING THERAPY: CPT | Mod: GP | Performed by: PHYSICAL THERAPY ASSISTANT

## 2024-04-15 PROCEDURE — 88112 CYTOPATH CELL ENHANCE TECH: CPT | Mod: 26 | Performed by: PATHOLOGY

## 2024-04-15 PROCEDURE — 250N000013 HC RX MED GY IP 250 OP 250 PS 637: Performed by: PHYSICIAN ASSISTANT

## 2024-04-15 RX ORDER — ONDANSETRON 4 MG/1
4 TABLET, ORALLY DISINTEGRATING ORAL EVERY 6 HOURS PRN
Qty: 60 TABLET | Refills: 0 | Status: SHIPPED | OUTPATIENT
Start: 2024-04-15

## 2024-04-15 RX ORDER — IBUPROFEN 600 MG/1
600 TABLET, FILM COATED ORAL EVERY 6 HOURS PRN
Qty: 60 TABLET | Refills: 0 | Status: SHIPPED | OUTPATIENT
Start: 2024-04-15

## 2024-04-15 RX ORDER — NYSTATIN 100000 [USP'U]/G
POWDER TOPICAL EVERY 4 HOURS PRN
Qty: 30 G | Refills: 1 | Status: SHIPPED | OUTPATIENT
Start: 2024-04-15

## 2024-04-15 RX ORDER — PROCHLORPERAZINE MALEATE 5 MG
5 TABLET ORAL EVERY 6 HOURS PRN
Qty: 60 TABLET | Refills: 0 | Status: SHIPPED | OUTPATIENT
Start: 2024-04-15

## 2024-04-15 RX ORDER — NYSTATIN 100000 U/G
CREAM TOPICAL 2 TIMES DAILY PRN
Status: DISCONTINUED | OUTPATIENT
Start: 2024-04-15 | End: 2024-04-15 | Stop reason: HOSPADM

## 2024-04-15 RX ORDER — NYSTATIN 100000 U/G
CREAM TOPICAL 2 TIMES DAILY PRN
Qty: 30 G | Refills: 0 | Status: SHIPPED | OUTPATIENT
Start: 2024-04-15 | End: 2024-04-15

## 2024-04-15 RX ORDER — OXYCODONE HYDROCHLORIDE 5 MG/1
5 TABLET ORAL EVERY 4 HOURS PRN
Qty: 30 TABLET | Refills: 0 | Status: SHIPPED | OUTPATIENT
Start: 2024-04-15

## 2024-04-15 RX ORDER — AMOXICILLIN 250 MG
1 CAPSULE ORAL 2 TIMES DAILY
Qty: 60 TABLET | Refills: 0 | Status: SHIPPED | OUTPATIENT
Start: 2024-04-15

## 2024-04-15 RX ADMIN — OXYCODONE HYDROCHLORIDE 5 MG: 5 TABLET ORAL at 01:44

## 2024-04-15 RX ADMIN — IBUPROFEN 600 MG: 600 TABLET ORAL at 15:32

## 2024-04-15 RX ADMIN — OXYCODONE HYDROCHLORIDE 5 MG: 5 TABLET ORAL at 13:45

## 2024-04-15 RX ADMIN — DOCUSATE SODIUM 50 MG AND SENNOSIDES 8.6 MG 1 TABLET: 8.6; 5 TABLET, FILM COATED ORAL at 09:09

## 2024-04-15 RX ADMIN — IBUPROFEN 600 MG: 600 TABLET ORAL at 02:45

## 2024-04-15 RX ADMIN — IBUPROFEN 600 MG: 600 TABLET ORAL at 09:09

## 2024-04-15 ASSESSMENT — ACTIVITIES OF DAILY LIVING (ADL)
ADLS_ACUITY_SCORE: 34
DEPENDENT_IADLS:: INDEPENDENT
ADLS_ACUITY_SCORE: 34
ADLS_ACUITY_SCORE: 33
ADLS_ACUITY_SCORE: 34
ADLS_ACUITY_SCORE: 33
ADLS_ACUITY_SCORE: 34

## 2024-04-15 NOTE — DISCHARGE INSTRUCTIONS
Please see paper instructions from [facility] preoperative visit for FULL details.    No lifting > 10 pounds x 6 weeks.    No bathing, swimming x 6 weeks. Okay to shower. Pat dry the 5 small robotic incisions. Closed with clear sutures and skin glue. Open to air and dry.    Pelvic rest x 6 weeks.    Can be seen outpatient at [facility] for nurse visit (with Swathi HARRIS) once discharged.    Okay for PT/OT as long as no lifting > 10 pounds.    Patient will have vaginal spotting / bleeding x 6-8 weeks, as part of her postoperative healing.

## 2024-04-15 NOTE — PROGRESS NOTES
"Date & Time: 4/14/230 1900-2300  Orientation/Cognitive: A& Ox4  Mobility Level/Assist Equipment: A1/ walker/GB  Fall Risk (Y/N): No  Behavior Concerns: Green  Pain Management: IV Toradol given once  Tele/VS/O2: VSS on RA, Blood pressure is a bit hypertensive  ABNL Lab/BG: None  Diet: Regular  Bowel/Bladder: Continent  Skin Concerns: Scattered bruises, 5 surgical lap sites. Abdominal fold rashes.   Drains/Devices: Right PIV SL  Tests/Procedures for next shift: None  Anticipated DC date & active delays: Pending TCU placement    BP (!) 169/94 (BP Location: Left arm)   Pulse 87   Temp 98.2  F (36.8  C) (Oral)   Resp 18   Ht 1.575 m (5' 2\")   Wt 115.8 kg (255 lb 6.4 oz)   SpO2 94%   BMI 46.71 kg/m      "

## 2024-04-15 NOTE — CONSULTS
Care Management Initial Consult    General Information  Assessment completed with: Patient, Family,  (Katie Osuna)  Type of CM/SW Visit: Initial Assessment    Primary Care Provider verified and updated as needed: Yes   Readmission within the last 30 days: no previous admission in last 30 days      Reason for Consult: discharge planning  Advance Care Planning: Advance Care Planning Reviewed: no concerns identified          Communication Assessment  Patient's communication style: spoken language (English or Bilingual)    Hearing Difficulty or Deaf: no   Wear Glasses or Blind: yes    Cognitive  Cognitive/Neuro/Behavioral: WDL  Level of Consciousness: alert  Arousal Level: opens eyes spontaneously  Orientation: oriented x 4  Mood/Behavior: calm, cooperative  Best Language: 0 - No aphasia  Speech: clear    Living Environment:   People in home: alone     Current living Arrangements: independent living facility      Able to return to prior arrangements: no  Living Arrangement Comments:  ([facility], daughter works there)    Family/Social Support:  Care provided by:    Provides care for: no one  Marital Status: Single  Children          Description of Support System: Supportive, Involved    Support Assessment: Adequate family and caregiver support    Current Resources:   Patient receiving home care services: No     Community Resources:    Equipment currently used at home: grab bar, tub/shower, shower chair, walker, rolling  Supplies currently used at home:      Employment/Financial:  Employment Status: retired        Financial Concerns: none   Referral to Financial Worker: No       Does the patient's insurance plan have a 3 day qualifying hospital stay waiver?  No    Lifestyle & Psychosocial Needs:  Social Determinants of Health     Food Insecurity: Not on file   Depression: Not at risk (11/16/2023)    [facility]    PHQ-2      PHQ-2 TOTAL SCORE: 0   Housing Stability: Not on file   Tobacco Use: Low Risk  (4/11/2024)    Patient History     Smoking Tobacco Use: Never     Smokeless Tobacco Use: Never     Passive Exposure: Past   Financial Resource Strain: High Risk (12/30/2021)    [facility]    Financial Resource Strain     Difficulty of Paying Living Expenses: Not on file     Difficulty of Paying Living Expenses: Not on file   Alcohol Use: Not on file   Transportation Needs: Not on file   Physical Activity: Not on file   Interpersonal Safety: Not on file   Stress: Not on file   Social Connections: Unknown (12/30/2021)    [facility], [facility]    Social Connections     Frequency of Communication with Friends and Family: Not on file   Health Literacy: Not on file       Functional Status:  Prior to admission patient needed assistance:   Dependent ADLs:: Ambulation-walker  Dependent IADLs:: Independent       Mental Health Status:  Mental Health Status: No Current Concerns       Chemical Dependency Status:  Chemical Dependency Status: No Current Concerns             Values/Beliefs:  Spiritual, Cultural Beliefs, Tenriism Practices, Values that affect care:                 Additional Information:  Consult for discharge planning. 73 year old female patient admitted 04/11/2024 for a pelvic mass and underwent a laparoscopic hysterectomy. Writer met with patient at bedside and introduced self and role. Patient resides in a senior living apartment independently. PT recommends TCU and patient would like referrals sent to [facility] and [facility]. Daughter can provide transportation.     GUILLERMO Uribe

## 2024-04-15 NOTE — PROGRESS NOTES
DATE & TIME: 4/14/24 [id number]  A&O, doing fine.  Family present for part of the evening.  Up in chair.  No concerns.

## 2024-04-15 NOTE — PROGRESS NOTES
Discharge    Patient discharged to TCU via Car with Daughter  Care plan note A&Ox4. VSS on Reg diet    Listed belongings gathered and given to patient (including from security/pharmacy). Yes  Care Plan and Patient education resolved: Yes  Prescriptions if needed, hard copies sent with patient  NA  Medication Bin checked and emptied on discharge Yes  SW/care coordinator/charge RN aware of discharge: Yes

## 2024-04-15 NOTE — PLAN OF CARE
[id number] 4/15/2024    POD#4 Laparoscopic hysterectomy, radha salpingo oopherectomy, pelvic washing  Orientation: A&O x4  Vitals/Tele: VSS on RA expt HTN at the times  Pain management: Scheduled Ibuprofen. PRN oxycodone   IV Access/drains: PIV SL. Bruises at IV sites and Right lower abd.   Diet: Regular  Mobility: Ax1 GB/W.  GI/: No BM this shift. Voiding in BR  Wound/Skin: 5 Abd lap sites.Rash radha under breasts. Scattered bruises.  Discharge Plan:Pending to TCU  Other Import Info: Pt was able to ambulate to BR x1. Abd pain and headache, Oxycodone given. Spotting on pad from vagina.

## 2024-04-15 NOTE — PROGRESS NOTES
Care Management Follow Up    Length of Stay (days): 0    Expected Discharge Date: 04/15/2024     Concerns to be Addressed:       Patient plan of care discussed at interdisciplinary rounds: No    Anticipated Discharge Disposition: Skilled Nursing Facility     Anticipated Discharge Services:    Anticipated Discharge DME:      Patient/family educated on Medicare website which has current facility and service quality ratings:    Education Provided on the Discharge Plan:    Patient/Family in Agreement with the Plan:      Referrals Placed by CM/SW:    Private pay costs discussed: Not applicable    Additional Information:  [facility]'s TCU can accept patient today if medically ready, if not today then they could accept Wednesday.     Patient would like a private room and are aware of $45 fee. Patient would like daughter to provide transportation. Writer paged doctor for orders.     GUILLERMO Uribe

## 2024-04-15 NOTE — DISCHARGE SUMMARY
Physician Discharge Summary     Patient ID:  Thao Majano  [id number]  73 year old  1950    Admit date: 4/11/2024    Discharge date and time: 4/15/2024     Admitting Physician: Hiral Arroyo MD     Discharge Physician: Hiral Arroyo MD    Admission Diagnoses: Pelvic mass [R19.00]  Post-menopause bleeding [N95.0]    Discharge Diagnoses: Pelvic Mass, Post menopausal bleeding    Admission Condition: good    Discharged Condition: good    Indication for Admission: Deconditioning, assist with ambulation due to pain, weakness and body habitus.    Hospital Course: Uneventful post op course, she was discharged home on POD #4.     Consults: PT/OT    Significant Diagnostic Studies: Final surgical pathology benign without any evidence of malignancy; (reviewed with patient prior to discharge)    Case Report   Surgical Pathology Report                         Case: [id number]                                   Authorizing Provider:  Hiral Arroyo MD     Collected:           04/11/2024 12:37 PM           Ordering Location:     [facility]          Received:            04/11/2024 12:56 PM                                  [facility] OR                                                             Pathologist:           Minna Rebolledo MD                                                           Intraop:               Nisa Molina MD                                                                           Specimens:   A) - Uterus, Cervix, Bilateral Fallopian Tubes & Ovaries, UTERUS, CERVIX, BILATERAL                  FALLOPIAN TUBE AND OVARIES -- FROZEN ON ENDOMETRIUM                                                  B) - Other, UTERINE MASS FOR FROZEN EXAM                                                  Final Diagnosis   A.  Uterus, cervix, bilateral fallopian tubes and ovaries, hysterectomy and bilateral  "salpingo-oophorectomy:  -Endometrial hyperplasia without atypia.  -Endometrial polyps.  -Adenomyosis.  -Leiomyomata.  -Unremarkable cervix.  -Unremarkable fallopian tubes.  -Unremarkable ovaries.     B.  Designated \"uterine mass\":  -Lipoleiomyoma.            Treatments: IV hydration, analgesia: acetaminophen, Dilaudid, and Oxycodone, therapies: PT, OT, and SW, and surgery: robotic assisted total laparoscopic hysterectomy, bilateral salpingo oophorectomy , removal of pelvic mass, pelvic washings.     Discharge Exam:  GENERAL APPEARANCE: alert, cooperative, conversational, no acute distress  HEENT: atraumatic, normocephalic, trachea midline, no obvious thyromegaly  CV: regular rate and rhythm, no murmurs heard  RESP: lungs clear to auscultation - no wheezes  ABDOMEN: soft, non-distended, non-tender. Well-healing laparoscopic incisions x 5. Closed with sutures and skin glue over top.  SKIN: no suspicious lesions or rashes  NEURO: no focal deficits  PSYCH: mentation appears normal and affect normal/bright    Disposition: SNF    Patient Instructions:        Review of your medicines        START taking        Dose / Directions   nystatin 010832 UNIT/GM external powder  Commonly known as: MYCOSTATIN  Used for: Candida infection      Apply topically every 4 hours as needed for other (skin yeast)  Quantity: 30 g  Refills: 1     ondansetron 4 MG ODT tab  Commonly known as: ZOFRAN ODT      Dose: 4 mg  Take 1 tablet (4 mg) by mouth every 6 hours as needed for nausea or vomiting  Quantity: 60 tablet  Refills: 0     oxyCODONE 5 MG tablet  Commonly known as: ROXICODONE      Dose: 5 mg  Take 1 tablet (5 mg) by mouth every 4 hours as needed for moderate pain  Quantity: 30 tablet  Refills: 0     prochlorperazine 5 MG tablet  Commonly known as: COMPAZINE      Dose: 5 mg  Take 1 tablet (5 mg) by mouth every 6 hours as needed for nausea or vomiting  Quantity: 60 tablet  Refills: 0     senna-docusate 8.6-50 MG tablet  Commonly known as: " SENOKOT-S/PERICOLACE      Dose: 1 tablet  Take 1 tablet by mouth 2 times daily  Quantity: 60 tablet  Refills: 0            CONTINUE these medicines which may have CHANGED, or have new prescriptions. If we are uncertain of the size of tablets/capsules you have at home, strength may be listed as something that might have changed.        Dose / Directions   ibuprofen 600 MG tablet  Commonly known as: ADVIL/MOTRIN  This may have changed:   medication strength  how much to take  when to take this  reasons to take this      Dose: 600 mg  Take 1 tablet (600 mg) by mouth every 6 hours as needed for moderate pain  Quantity: 60 tablet  Refills: 0            CONTINUE these medicines which have NOT CHANGED        Dose / Directions   aspirin 81 MG EC tablet      Dose: 81 mg  Take 81 mg by mouth daily as needed  Refills: 0     carboxymethylcellulose PF 0.5 % ophthalmic solution  Commonly known as: REFRESH PLUS      Dose: 1 drop  Place 1 drop into both eyes 3 times daily as needed for dry eyes  Refills: 0     CINNAMON PO      Dose: 1,200 mg  Take 1,200 mg by mouth every morning  Refills: 0     FISH OIL-KRILL OIL PO      Dose: 500 mg  Take 500 mg by mouth every morning  Refills: 0     polyethylene glycol-propylene glycol 0.4-0.3 % Soln ophthalmic solution  Commonly known as: SYSTANE ULTRA      Dose: 1 drop  Place 1 drop into both eyes daily as needed for dry eyes  Refills: 0     Turmeric 500 MG Tabs      Dose: 2 tablet  Take 2 tablets by mouth every morning  Refills: 0            STOP taking      Elderberry Immune Complex Chew        Multi-vitamin per tablet  Generic drug: multivitamin w/minerals        Vitamin C 500 MG Caps        Vitamin D3 125 MCG (5000 UT) tablet        Vitamin E 90 MG (200 UNIT) capsule        zinc sulfate 220 (50 Zn) MG capsule  Commonly known as: ZINCATE                  Where to get your medicines        Some of these will need a paper prescription and others can be bought over the counter. Ask your nurse  if you have questions.    Bring a paper prescription for each of these medications  ibuprofen 600 MG tablet  nystatin 926366 UNIT/GM external powder  ondansetron 4 MG ODT tab  oxyCODONE 5 MG tablet  prochlorperazine 5 MG tablet  senna-docusate 8.6-50 MG tablet           Activity: activity as tolerated with no lifting > 10 pounds and no sex for 6 weeks  Diet: regular diet  Wound Care: as directed    Follow-up with Parisa Woodall in 2 weeks. Follow up on 4/26 at 9:00 AM in [facility] with Parisa Woodall.     Signed:  Hiral Arroyo MD on 4/15/2024 at 1:02 PM

## 2024-04-15 NOTE — PROGRESS NOTES
Care Management Discharge Note    Discharge Date: 04/15/2024       Discharge Disposition: Skilled Nursing Facility    Discharge Services:      Discharge DME:      Discharge Transportation: car, drives self, family or friend will provide    Private pay costs discussed: private room/amenity fees    Does the patient's insurance plan have a 3 day qualifying hospital stay waiver?  No    PAS Confirmation Code: [id number]  Patient/family educated on Medicare website which has current facility and service quality ratings: no    Education Provided on the Discharge Plan:    Persons Notified of Discharge Plans: doctor, facility, patient, family   Patient/Family in Agreement with the Plan:  Yes    Handoff Referral Completed: No    Additional Information:  Writer called [facility], patient can go into a private room for $45 per day and the TCU stay is covered by insurance. Orders sent, facility, HUC and nursing staff updated, waiting for signed scripts.  PAS completed.     PAS-RR    D: Per DHS regulation, SW completed and submitted PAS-RR to [place] [employer] Direct Connect via the Senior LinkAge Line.  PAS-RR confirmation # is : [id number]    I: SW spoke with PT  and they are aware a PAS-RR has been submitted.  SW reviewed with Pt  that they may be contacted for a follow up appointment within 10 days of hospital discharge if their SNF stay is < 30 days.  Contact information for Senior LinkAge Line was also provided.    A: Pt  verbalized understanding.    P: Further questions may be directed to [facility] LinkAge Line at #[phone], option #4 for PAS-RR staff.     Safe discharge plan to [facility]'s TCU followed.     GUILLERMO Uribe

## 2024-04-16 NOTE — PLAN OF CARE
Occupational Therapy Discharge Summary    Reason for therapy discharge:    Discharged to transitional care facility.    Progress towards therapy goal(s). See goals on Care Plan in [facility] electronic health record for goal details.  Goals partially met.  Barriers to achieving goals:   discharge from facility.    Therapy recommendation(s):    Continued therapy is recommended.  Rationale/Recommendations:  Pt functioning below baseline, decreased activity ashley, mobility, strength, precautions, impacting I/ADLs. Pt resides alone in an ind living facility. Pt currently Ax 1 for functional mobility and self cares at room level with FWW. Rec skilled TCU for incr I/ADL ind prior to return to PLOF.    **Pt not seen by discharging therapist on this date, note written based on previous treating therapist's notes and recommendations

## 2024-04-16 NOTE — PLAN OF CARE
Physical Therapy Discharge Summary    Reason for therapy discharge:    Discharged to transitional care facility.    Progress towards therapy goal(s). See goals on Care Plan in [facility] electronic health record for goal details.  Goals not met.  Barriers to achieving goals:   discharge from facility.    Therapy recommendation(s):    Continued therapy is recommended.  Rationale/Recommendations:  To progress independence and safety with functional mobility.

## 2024-04-26 ENCOUNTER — DOCUMENTATION ONLY (OUTPATIENT)
Dept: OTHER | Facility: CLINIC | Age: 74
End: 2024-04-26
Payer: COMMERCIAL

## (undated) DEVICE — SOL NACL 0.9% IRRIG 1000ML BOTTLE 2F7124

## (undated) DEVICE — ESU GROUND PAD UNIVERSAL W/O CORD

## (undated) DEVICE — SYR 10ML FINGER CONTROL W/O NDL 309695

## (undated) DEVICE — DAVINCI XI NDL DRIVER LARGE 470006

## (undated) DEVICE — SU MONOCRYL 4-0 PS-2 18" UND Y496G

## (undated) DEVICE — DRAPE IOBAN INCISE 23X17" 6650EZ

## (undated) DEVICE — DAVINCI XI OBTURATOR BLADELESS 8MM 470359

## (undated) DEVICE — SOL WATER IRRIG 1000ML BOTTLE 2F7114

## (undated) DEVICE — POUCH TISSUE RETRIEVAL ROBOTIC 8MM 5.1" INTRO TRS-ROBO-8

## (undated) DEVICE — DRAPE CV SPLIT II 147X106" 9158

## (undated) DEVICE — ENDO APPLICATOR SURGIFLO PLASMA COBLATION MS1995

## (undated) DEVICE — GLOVE BIOGEL PI MICRO SZ 6.5 48565

## (undated) DEVICE — RETR ELEV / UTERINE MANIPULATOR V-CARE MED CUP 60-6085-201

## (undated) DEVICE — DECANTER VIAL 2006S

## (undated) DEVICE — ANTIFOG SOLUTION SEE SHARP 150M TROCAR SWABS 30978

## (undated) DEVICE — SU VICRYL 2-0 CT-1 27" UND J259H

## (undated) DEVICE — DAVINCI XI DRAPE ARM 470015

## (undated) DEVICE — SU DERMABOND ADVANCED .7ML DNX12

## (undated) DEVICE — LINEN TOWEL PACK X5 5464

## (undated) DEVICE — NDL INSUFFLATION 13GA 120MM C2201

## (undated) DEVICE — DAVINCI XI ESU FCP BIPOLAR MARYLAND 470172

## (undated) DEVICE — DAVINCI XI MONOPOLAR SCISSORS HOT SHEARS 8MM 470179

## (undated) DEVICE — ENDO TROCAR CONMED AIRSEAL BLADELESS 08X120MM IAS8-120LP

## (undated) DEVICE — TUBING CONMED AIRSEAL SMOKE EVAC INSUFFLATION ASM-EVAC

## (undated) DEVICE — SPONGE LAP 18X18" X8435

## (undated) DEVICE — PACK DAVINCI GYN SMA15GDFS1

## (undated) DEVICE — KIT PATIENT POSITIONING PIGAZZI LATEX FREE 40580

## (undated) DEVICE — CATH TRAY FOLEY SURESTEP 16FR WDRAIN BAG STLK LATEX A300316A

## (undated) DEVICE — DAVINCI XI TISSUE SEALER SYNCROSEAL 8MM 480440

## (undated) DEVICE — DAVINCI HOT SHEARS TIP COVER  400180

## (undated) DEVICE — DAVINCI XI GRASPER ENDOWRIST PROGRASP 470093

## (undated) DEVICE — DAVINCI XI DRAPE COLUMN 470341

## (undated) DEVICE — GLOVE BIOGEL PI MICRO INDICATOR UNDERGLOVE SZ 6.5 48965

## (undated) DEVICE — DAVINCI XI SEAL UNIVERSAL 5-8MM 470361

## (undated) DEVICE — SOL NACL 0.9% INJ 1000ML BAG 2B1324X

## (undated) DEVICE — SU WND CLOSURE VLOC 180 ABS 0 12" GS-21 VLOCL0316

## (undated) DEVICE — SUCTION IRR STRYKERFLOW II W/TIP 250-070-520

## (undated) RX ORDER — PROPOFOL 10 MG/ML
INJECTION, EMULSION INTRAVENOUS
Status: DISPENSED
Start: 2024-04-11

## (undated) RX ORDER — CEFAZOLIN SODIUM/WATER 2 G/20 ML
SYRINGE (ML) INTRAVENOUS
Status: DISPENSED
Start: 2024-04-11

## (undated) RX ORDER — ACETAMINOPHEN 325 MG/1
TABLET ORAL
Status: DISPENSED
Start: 2024-04-11

## (undated) RX ORDER — HEPARIN SODIUM 5000 [USP'U]/.5ML
INJECTION, SOLUTION INTRAVENOUS; SUBCUTANEOUS
Status: DISPENSED
Start: 2024-04-11

## (undated) RX ORDER — HYDROMORPHONE HYDROCHLORIDE 1 MG/ML
INJECTION, SOLUTION INTRAMUSCULAR; INTRAVENOUS; SUBCUTANEOUS
Status: DISPENSED
Start: 2024-04-11

## (undated) RX ORDER — FENTANYL CITRATE 50 UG/ML
INJECTION, SOLUTION INTRAMUSCULAR; INTRAVENOUS
Status: DISPENSED
Start: 2024-04-11

## (undated) RX ORDER — METRONIDAZOLE 500 MG/100ML
INJECTION, SOLUTION INTRAVENOUS
Status: DISPENSED
Start: 2024-04-11